# Patient Record
Sex: MALE | Race: WHITE | Employment: OTHER | URBAN - METROPOLITAN AREA
[De-identification: names, ages, dates, MRNs, and addresses within clinical notes are randomized per-mention and may not be internally consistent; named-entity substitution may affect disease eponyms.]

---

## 2017-03-30 PROBLEM — R60.0 BILATERAL LOWER EXTREMITY EDEMA: Status: ACTIVE | Noted: 2017-03-30

## 2017-03-30 PROBLEM — R41.0 ACUTE DELIRIUM: Status: ACTIVE | Noted: 2017-03-30

## 2017-03-30 PROBLEM — F41.8 DEPRESSION WITH ANXIETY: Status: ACTIVE | Noted: 2017-03-30

## 2017-03-30 PROBLEM — I87.2 VENOUS STASIS DERMATITIS OF BOTH LOWER EXTREMITIES: Status: ACTIVE | Noted: 2017-03-30

## 2017-12-15 ENCOUNTER — HOSPITAL ENCOUNTER (OUTPATIENT)
Dept: VASCULAR LAB | Age: 67
Discharge: HOME | End: 2017-12-15

## 2017-12-15 DIAGNOSIS — R60.0 LOCALIZED EDEMA: ICD-10-CM

## 2018-01-16 ENCOUNTER — OFFICE VISIT (OUTPATIENT)
Dept: SURGERY | Age: 68
End: 2018-01-16

## 2018-01-16 VITALS
WEIGHT: 201 LBS | SYSTOLIC BLOOD PRESSURE: 111 MMHG | HEART RATE: 74 BPM | HEIGHT: 70 IN | BODY MASS INDEX: 28.77 KG/M2 | DIASTOLIC BLOOD PRESSURE: 66 MMHG

## 2018-01-16 DIAGNOSIS — I87.2 VENOUS STASIS DERMATITIS OF BOTH LOWER EXTREMITIES: ICD-10-CM

## 2018-01-16 DIAGNOSIS — R60.0 BILATERAL LOWER EXTREMITY EDEMA: ICD-10-CM

## 2018-01-16 DIAGNOSIS — R60.0 LOCALIZED EDEMA: ICD-10-CM

## 2018-01-16 DIAGNOSIS — I89.0 LYMPHEDEMA OF LEFT LOWER EXTREMITY: Primary | ICD-10-CM

## 2018-01-16 DIAGNOSIS — L03.032 CELLULITIS OF TOE OF LEFT FOOT: ICD-10-CM

## 2018-01-16 PROCEDURE — 1123F ACP DISCUSS/DSCN MKR DOCD: CPT | Performed by: SURGERY

## 2018-01-16 PROCEDURE — 99204 OFFICE O/P NEW MOD 45 MIN: CPT | Performed by: SURGERY

## 2018-01-16 PROCEDURE — 4040F PNEUMOC VAC/ADMIN/RCVD: CPT | Performed by: SURGERY

## 2018-01-16 PROCEDURE — G8427 DOCREV CUR MEDS BY ELIG CLIN: HCPCS | Performed by: SURGERY

## 2018-01-16 PROCEDURE — G8419 CALC BMI OUT NRM PARAM NOF/U: HCPCS | Performed by: SURGERY

## 2018-01-16 PROCEDURE — 1036F TOBACCO NON-USER: CPT | Performed by: SURGERY

## 2018-01-16 PROCEDURE — 3017F COLORECTAL CA SCREEN DOC REV: CPT | Performed by: SURGERY

## 2018-01-16 PROCEDURE — G8484 FLU IMMUNIZE NO ADMIN: HCPCS | Performed by: SURGERY

## 2018-01-16 RX ORDER — DOXYCYCLINE 100 MG/1
CAPSULE ORAL
Status: ON HOLD | COMMUNITY
Start: 2018-01-10 | End: 2021-01-04 | Stop reason: ALTCHOICE

## 2018-01-16 ASSESSMENT — ENCOUNTER SYMPTOMS: COLOR CHANGE: 1

## 2018-01-16 NOTE — PROGRESS NOTES
Subjective:      Patient ID: Charleen Burgos is a 78 y.o. male. Chief Complaint   Patient presents with   [facility]     Patient here today referred by Martín Banerjee NP at [facility] for chronic edema of left lower extremity, on a pain scale 0/10 but does have discomfort. HPIPatient presents for left leg swelling. States that he has had swelling for 5 years. Ultrasound results were reviewed today and was negative for DVT. He has been given compression stockings in the past but he has not bought those yet. He is on Doxcycline for left leg cellulitis. He sees Dr Ifrah Wright podiatrist and he has prescribed the antibiotics (topical cream and oral)    Review of Systems   Cardiovascular: Positive for leg swelling (left leg lymphedema). Musculoskeletal: Positive for gait problem. Skin: Positive for color change (discolored left leg). All other systems reviewed and are negative. Objective:   Physical Exam   Constitutional: He is oriented to person, place, and time. He appears well-developed and well-nourished. HENT:   Head: Normocephalic and atraumatic. Right Ear: External ear normal.   Left Ear: External ear normal.   Eyes: Pupils are equal, round, and reactive to light. No scleral icterus. Neck: Normal range of motion. Neck supple. No JVD present. No tracheal deviation present. No thyromegaly present. Cardiovascular: Normal rate, regular rhythm, S1 normal and normal heart sounds. Exam reveals no gallop. No murmur heard. Pulses:       Carotid pulses are 2+ on the right side, and 2+ on the left side. Femoral pulses are 2+ on the right side, and 2+ on the left side. Dorsalis pedis pulses are 1+ on the right side, and 2+ on the left side. Posterior tibial pulses are 1+ on the right side, and 1+ on the left side. Pulmonary/Chest: Effort normal and breath sounds normal. He has no wheezes. He has no rales. He exhibits no tenderness.    Abdominal: Soft. Bowel sounds are normal. He exhibits no distension, no abdominal bruit and no mass. There is no hepatosplenomegaly. There is no tenderness. There is no rebound, no guarding and no CVA tenderness. No hernia. Hernia confirmed negative in the ventral area, confirmed negative in the right inguinal area and confirmed negative in the left inguinal area. Genitourinary:   Genitourinary Comments: RECTAL EXAM  &  Guaiac NOT INDICATED   Musculoskeletal: Normal range of motion. He exhibits no edema. Legs:       Left foot: There is tenderness and swelling. Feet:    LEG MEASUREMENTS TODAY 1/16/18    RIGHT ANKLE:  23.6 CM  RIGHT CALF:  39.8 CM    LEFT ANKLE:  30 CM  LEFT CALF:  41 CM   Lymphadenopathy:        Head (right side): No submental, no submandibular, no preauricular and no occipital adenopathy present. Head (left side): No submental, no submandibular, no preauricular and no occipital adenopathy present. He has no cervical adenopathy. Right cervical: No superficial cervical, no deep cervical and no posterior cervical adenopathy present. Left cervical: No superficial cervical, no deep cervical and no posterior cervical adenopathy present. Right axillary: No pectoral and no lateral adenopathy present. Left axillary: No pectoral and no lateral adenopathy present. Right: No inguinal and no supraclavicular adenopathy present. Left: No inguinal and no supraclavicular adenopathy present. Neurological: He is alert and oriented to person, place, and time. He displays no atrophy and no tremor. No cranial nerve deficit or sensory deficit. He exhibits normal muscle tone. Coordination and gait abnormal.   Skin: Skin is warm and dry. Psychiatric: He has a normal mood and affect. His behavior is normal. Judgment and thought content normal.   Disheveled with body odor       Assessment:      1. Lymphedema of left lower extremity     2.  Cellulitis of toe of left

## 2018-01-16 NOTE — PATIENT INSTRUCTIONS
Continue with the Doxycycline until gone. Continue with antibiotic ointment to the 3rd toe on of left foot. Continue to see Dr Axel Villanueva for foot care. New RX given today for surgical compression stockings 30-40mmHG. If he does get the stockings, return to office in 6 weeks.

## 2018-03-06 ENCOUNTER — OFFICE VISIT (OUTPATIENT)
Dept: SURGERY | Age: 68
End: 2018-03-06

## 2018-03-06 VITALS
DIASTOLIC BLOOD PRESSURE: 79 MMHG | BODY MASS INDEX: 30.24 KG/M2 | HEIGHT: 71 IN | WEIGHT: 216 LBS | SYSTOLIC BLOOD PRESSURE: 135 MMHG | HEART RATE: 78 BPM

## 2018-03-06 DIAGNOSIS — L03.032 CELLULITIS OF TOE OF LEFT FOOT: ICD-10-CM

## 2018-03-06 DIAGNOSIS — I87.2 VENOUS STASIS DERMATITIS OF BOTH LOWER EXTREMITIES: ICD-10-CM

## 2018-03-06 DIAGNOSIS — R60.0 BILATERAL LOWER EXTREMITY EDEMA: Primary | ICD-10-CM

## 2018-03-06 DIAGNOSIS — E78.2 MIXED HYPERLIPIDEMIA: ICD-10-CM

## 2018-03-06 PROCEDURE — 99213 OFFICE O/P EST LOW 20 MIN: CPT | Performed by: SURGERY

## 2018-03-06 PROCEDURE — 3017F COLORECTAL CA SCREEN DOC REV: CPT | Performed by: SURGERY

## 2018-03-06 PROCEDURE — G8484 FLU IMMUNIZE NO ADMIN: HCPCS | Performed by: SURGERY

## 2018-03-06 PROCEDURE — 4040F PNEUMOC VAC/ADMIN/RCVD: CPT | Performed by: SURGERY

## 2018-03-06 PROCEDURE — 1036F TOBACCO NON-USER: CPT | Performed by: SURGERY

## 2018-03-06 PROCEDURE — G8427 DOCREV CUR MEDS BY ELIG CLIN: HCPCS | Performed by: SURGERY

## 2018-03-06 PROCEDURE — G8417 CALC BMI ABV UP PARAM F/U: HCPCS | Performed by: SURGERY

## 2018-03-06 PROCEDURE — 1123F ACP DISCUSS/DSCN MKR DOCD: CPT | Performed by: SURGERY

## 2018-03-06 ASSESSMENT — ENCOUNTER SYMPTOMS
EYE DISCHARGE: 0
COLOR CHANGE: 1
EYE REDNESS: 0
EYE PAIN: 0
PHOTOPHOBIA: 0
EYE ITCHING: 0
GASTROINTESTINAL NEGATIVE: 1
ALLERGIC/IMMUNOLOGIC NEGATIVE: 1
RESPIRATORY NEGATIVE: 1

## 2018-03-06 NOTE — PROGRESS NOTES
External ear normal.   Left Ear: External ear normal.   Eyes: Pupils are equal, round, and reactive to light. No scleral icterus. Neck: Normal range of motion. Neck supple. No JVD present. No tracheal deviation present. No thyromegaly present. Cardiovascular: Normal rate, regular rhythm, S1 normal and normal heart sounds. Exam reveals no gallop. No murmur heard. Pulses:       Carotid pulses are 2+ on the right side, and 2+ on the left side. Femoral pulses are 2+ on the right side, and 2+ on the left side. Dorsalis pedis pulses are 2+ on the right side, and 2+ on the left side. Posterior tibial pulses are 1+ on the right side, and 1+ on the left side. MEASUREMENTS 23/6/2018:    RIGHT ANKLE: 24.8 cm    RIGHT CALF: 39.7 cm     LEFT ANKLE: 28.4 cm   LEFT CALF: 52.5 cm     LEG MEASUREMENTS  1/16/18    RIGHT ANKLE:  23.6 CM  RIGHT CALF:  39.8 CM    LEFT ANKLE:  30 CM  LEFT CALF:  41 CM           Pulmonary/Chest: Effort normal and breath sounds normal. He has no wheezes. He has no rales. He exhibits no tenderness. Abdominal: Soft. Bowel sounds are normal. He exhibits no distension, no abdominal bruit and no mass. There is no hepatosplenomegaly. There is no tenderness. There is no rebound, no guarding and no CVA tenderness. No hernia. Hernia confirmed negative in the ventral area, confirmed negative in the right inguinal area and confirmed negative in the left inguinal area. Genitourinary:   Genitourinary Comments: RECTAL EXAM  &  Guaiac NOT INDICATED   Musculoskeletal: Normal range of motion. He exhibits no edema. Legs:       Left foot: There is tenderness and swelling. Feet:    Lymphadenopathy:        Head (right side): No submental, no submandibular, no preauricular and no occipital adenopathy present. Head (left side): No submental, no submandibular, no preauricular and no occipital adenopathy present. He has no cervical adenopathy.         Right cervical: No superficial cervical, no deep cervical and no posterior cervical adenopathy present. Left cervical: No superficial cervical, no deep cervical and no posterior cervical adenopathy present. Right axillary: No pectoral and no lateral adenopathy present. Left axillary: No pectoral and no lateral adenopathy present. Right: No inguinal and no supraclavicular adenopathy present. Left: No inguinal and no supraclavicular adenopathy present. Neurological: He is alert and oriented to person, place, and time. He displays no atrophy and no tremor. No cranial nerve deficit or sensory deficit. He exhibits normal muscle tone. Coordination and gait abnormal.   Skin: Skin is warm and dry. Psychiatric: He has a normal mood and affect. His behavior is normal. Judgment and thought content normal.   Disheveled with body odor         ASSESSMENT:    Problem List Items Addressed This Visit     Mixed hyperlipidemia    Bilateral lower extremity edema - Primary    Venous stasis dermatitis of both lower extremities    Cellulitis of toe of left foot      POOR hygiene  Stockings shredded    PLAN:  Mr. Maurice Lazar has been advised to get a new pair of surgical stockings, he should apply the stockings daily to the left lower extremity. He has also been informed to purchase the appropriate gloves to help apply the surgical stockings. Applied two Ace-Bandages to the left lower extremity, he has been advised that he may take the Ace-Bandages off at night and re-apply these during the day. Polysporin applied to the toe of the left foot with a bandage. MEASUREMENTS 23/6/2018:    RIGHT ANKLE: 24.8 cm    RIGHT CALF: 39.7 cm     LEFT ANKLE: 28.4 cm   LEFT CALF: 52.5 cm     PREVIOUS LEG MEASUREMENTS 1/16/18    RIGHT ANKLE:  23.6 CM  RIGHT CALF:  39.8 CM    LEFT ANKLE:  30 CM  LEFT CALF:  41 CM    Return in about 3 months (around 6/6/2018), or surgical stocking check and swelling check.     CHERYL Melendez MA am scribing for and in the presence of Michelle Joe MD on this date of 03/07/18    I Petra Vazquez MD personally performed the services described in this documentation as scribed by the Medical Assistant Sulma Dawson  in my presence and it is both accurate and complete.         Electronically signed by Michelle Joe MD on 3/7/2018 at 12:52 PM

## 2021-01-03 ENCOUNTER — HOSPITAL ENCOUNTER (INPATIENT)
Age: 71
LOS: 4 days | Discharge: HOSPICE | DRG: 871 | End: 2021-01-07
Attending: EMERGENCY MEDICINE | Admitting: INTERNAL MEDICINE
Payer: MEDICARE

## 2021-01-03 ENCOUNTER — APPOINTMENT (OUTPATIENT)
Dept: CT IMAGING | Age: 71
DRG: 871 | End: 2021-01-03
Payer: MEDICARE

## 2021-01-03 ENCOUNTER — APPOINTMENT (OUTPATIENT)
Dept: GENERAL RADIOLOGY | Age: 71
DRG: 871 | End: 2021-01-03
Payer: MEDICARE

## 2021-01-03 DIAGNOSIS — T79.6XXA TRAUMATIC RHABDOMYOLYSIS, INITIAL ENCOUNTER (HCC): Primary | ICD-10-CM

## 2021-01-03 DIAGNOSIS — E86.0 DEHYDRATION: ICD-10-CM

## 2021-01-03 DIAGNOSIS — R41.0 DELIRIUM: ICD-10-CM

## 2021-01-03 DIAGNOSIS — R31.9 URINARY TRACT INFECTION WITH HEMATURIA, SITE UNSPECIFIED: ICD-10-CM

## 2021-01-03 DIAGNOSIS — N39.0 URINARY TRACT INFECTION WITH HEMATURIA, SITE UNSPECIFIED: ICD-10-CM

## 2021-01-03 PROBLEM — M62.82 RHABDOMYOLYSIS: Status: ACTIVE | Noted: 2021-01-03

## 2021-01-03 LAB
A/G RATIO: 1.2 (ref 1.1–2.2)
ALBUMIN SERPL-MCNC: 3.7 G/DL (ref 3.4–5)
ALP BLD-CCNC: 75 U/L (ref 40–129)
ALT SERPL-CCNC: 42 U/L (ref 10–40)
AMPHETAMINE SCREEN, URINE: NORMAL
ANION GAP SERPL CALCULATED.3IONS-SCNC: 10 MMOL/L (ref 3–16)
AST SERPL-CCNC: 101 U/L (ref 15–37)
BARBITURATE SCREEN URINE: NORMAL
BASE EXCESS VENOUS: 3.4 MMOL/L
BASOPHILS ABSOLUTE: 0 K/UL (ref 0–0.2)
BASOPHILS RELATIVE PERCENT: 0.1 %
BENZODIAZEPINE SCREEN, URINE: NORMAL
BILIRUB SERPL-MCNC: 0.8 MG/DL (ref 0–1)
BILIRUBIN URINE: ABNORMAL
BLOOD, URINE: ABNORMAL
BUN BLDV-MCNC: 32 MG/DL (ref 7–20)
CALCIUM SERPL-MCNC: 9.3 MG/DL (ref 8.3–10.6)
CANNABINOID SCREEN URINE: NORMAL
CARBOXYHEMOGLOBIN: 1.1 %
CHLORIDE BLD-SCNC: 104 MMOL/L (ref 99–110)
CLARITY: ABNORMAL
CO2: 26 MMOL/L (ref 21–32)
COCAINE METABOLITE SCREEN URINE: NORMAL
COLOR: YELLOW
COMMENT UA: ABNORMAL
CREAT SERPL-MCNC: 0.6 MG/DL (ref 0.8–1.3)
EOSINOPHILS ABSOLUTE: 0 K/UL (ref 0–0.6)
EOSINOPHILS RELATIVE PERCENT: 0 %
EPITHELIAL CELLS, UA: 4 /HPF (ref 0–5)
GFR AFRICAN AMERICAN: >60
GFR NON-AFRICAN AMERICAN: >60
GLOBULIN: 3.1 G/DL
GLUCOSE BLD-MCNC: 122 MG/DL (ref 70–99)
GLUCOSE URINE: NEGATIVE MG/DL
HCO3 VENOUS: 29 MMOL/L (ref 23–29)
HCT VFR BLD CALC: 41 % (ref 40.5–52.5)
HEMOGLOBIN: 13.8 G/DL (ref 13.5–17.5)
HYALINE CASTS: 10 /LPF (ref 0–8)
KETONES, URINE: 15 MG/DL
LACTIC ACID, SEPSIS: 1.9 MMOL/L (ref 0.4–1.9)
LEUKOCYTE ESTERASE, URINE: NEGATIVE
LYMPHOCYTES ABSOLUTE: 0.5 K/UL (ref 1–5.1)
LYMPHOCYTES RELATIVE PERCENT: 2.7 %
Lab: NORMAL
MCH RBC QN AUTO: 30.6 PG (ref 26–34)
MCHC RBC AUTO-ENTMCNC: 33.6 G/DL (ref 31–36)
MCV RBC AUTO: 91.2 FL (ref 80–100)
METHADONE SCREEN, URINE: NORMAL
METHEMOGLOBIN VENOUS: 0.5 %
MICROSCOPIC EXAMINATION: YES
MONOCYTES ABSOLUTE: 1 K/UL (ref 0–1.3)
MONOCYTES RELATIVE PERCENT: 6 %
NEUTROPHILS ABSOLUTE: 15.7 K/UL (ref 1.7–7.7)
NEUTROPHILS RELATIVE PERCENT: 91.2 %
NITRITE, URINE: NEGATIVE
O2 CONTENT, VEN: 7 ML/DL
O2 SAT, VEN: 35 %
O2 THERAPY: NORMAL
OPIATE SCREEN URINE: NORMAL
OXYCODONE URINE: NORMAL
PCO2, VEN: 49.1 MMHG (ref 40–50)
PDW BLD-RTO: 13.1 % (ref 12.4–15.4)
PH UA: 5.5 (ref 5–8)
PH UA: 6
PH VENOUS: 7.39 (ref 7.35–7.45)
PHENCYCLIDINE SCREEN URINE: NORMAL
PLATELET # BLD: 291 K/UL (ref 135–450)
PMV BLD AUTO: 6.9 FL (ref 5–10.5)
PO2, VEN: 22 MMHG
POTASSIUM SERPL-SCNC: 4.2 MMOL/L (ref 3.5–5.1)
PROPOXYPHENE SCREEN: NORMAL
PROTEIN UA: 30 MG/DL
RBC # BLD: 4.49 M/UL (ref 4.2–5.9)
RBC UA: 22 /HPF (ref 0–4)
RENAL EPITHELIAL, UA: ABNORMAL /HPF (ref 0–1)
SARS-COV-2, NAAT: NOT DETECTED
SODIUM BLD-SCNC: 140 MMOL/L (ref 136–145)
SPECIFIC GRAVITY UA: >=1.03 (ref 1–1.03)
TCO2 CALC VENOUS: 31 MMOL/L
TOTAL CK: 3024 U/L (ref 39–308)
TOTAL PROTEIN: 6.8 G/DL (ref 6.4–8.2)
TROPONIN: <0.01 NG/ML
URINE REFLEX TO CULTURE: YES
URINE TYPE: ABNORMAL
UROBILINOGEN, URINE: 0.2 E.U./DL
WBC # BLD: 17.2 K/UL (ref 4–11)
WBC UA: 55 /HPF (ref 0–5)

## 2021-01-03 PROCEDURE — 81001 URINALYSIS AUTO W/SCOPE: CPT

## 2021-01-03 PROCEDURE — 99284 EMERGENCY DEPT VISIT MOD MDM: CPT

## 2021-01-03 PROCEDURE — 70450 CT HEAD/BRAIN W/O DYE: CPT

## 2021-01-03 PROCEDURE — 6370000000 HC RX 637 (ALT 250 FOR IP): Performed by: INTERNAL MEDICINE

## 2021-01-03 PROCEDURE — 96374 THER/PROPH/DIAG INJ IV PUSH: CPT

## 2021-01-03 PROCEDURE — 82550 ASSAY OF CK (CPK): CPT

## 2021-01-03 PROCEDURE — 83605 ASSAY OF LACTIC ACID: CPT

## 2021-01-03 PROCEDURE — 72125 CT NECK SPINE W/O DYE: CPT

## 2021-01-03 PROCEDURE — 71045 X-RAY EXAM CHEST 1 VIEW: CPT

## 2021-01-03 PROCEDURE — 36415 COLL VENOUS BLD VENIPUNCTURE: CPT

## 2021-01-03 PROCEDURE — 6360000002 HC RX W HCPCS: Performed by: EMERGENCY MEDICINE

## 2021-01-03 PROCEDURE — 80307 DRUG TEST PRSMV CHEM ANLYZR: CPT

## 2021-01-03 PROCEDURE — 73630 X-RAY EXAM OF FOOT: CPT

## 2021-01-03 PROCEDURE — 6360000002 HC RX W HCPCS: Performed by: INTERNAL MEDICINE

## 2021-01-03 PROCEDURE — 2580000003 HC RX 258: Performed by: INTERNAL MEDICINE

## 2021-01-03 PROCEDURE — 93005 ELECTROCARDIOGRAM TRACING: CPT | Performed by: EMERGENCY MEDICINE

## 2021-01-03 PROCEDURE — 82803 BLOOD GASES ANY COMBINATION: CPT

## 2021-01-03 PROCEDURE — 72170 X-RAY EXAM OF PELVIS: CPT

## 2021-01-03 PROCEDURE — 80053 COMPREHEN METABOLIC PANEL: CPT

## 2021-01-03 PROCEDURE — U0002 COVID-19 LAB TEST NON-CDC: HCPCS

## 2021-01-03 PROCEDURE — 2580000003 HC RX 258: Performed by: EMERGENCY MEDICINE

## 2021-01-03 PROCEDURE — 72131 CT LUMBAR SPINE W/O DYE: CPT

## 2021-01-03 PROCEDURE — 85025 COMPLETE CBC W/AUTO DIFF WBC: CPT

## 2021-01-03 PROCEDURE — 2060000000 HC ICU INTERMEDIATE R&B

## 2021-01-03 PROCEDURE — 84484 ASSAY OF TROPONIN QUANT: CPT

## 2021-01-03 PROCEDURE — 87086 URINE CULTURE/COLONY COUNT: CPT

## 2021-01-03 RX ORDER — ASPIRIN 300 MG/1
300 SUPPOSITORY RECTAL DAILY
Status: DISCONTINUED | OUTPATIENT
Start: 2021-01-03 | End: 2021-01-07

## 2021-01-03 RX ORDER — SODIUM CHLORIDE 9 MG/ML
INJECTION, SOLUTION INTRAVENOUS CONTINUOUS
Status: DISCONTINUED | OUTPATIENT
Start: 2021-01-03 | End: 2021-01-07

## 2021-01-03 RX ORDER — QUETIAPINE FUMARATE 25 MG/1
50 TABLET, FILM COATED ORAL 2 TIMES DAILY
Status: DISCONTINUED | OUTPATIENT
Start: 2021-01-03 | End: 2021-01-07 | Stop reason: HOSPADM

## 2021-01-03 RX ORDER — 0.9 % SODIUM CHLORIDE 0.9 %
500 INTRAVENOUS SOLUTION INTRAVENOUS ONCE
Status: COMPLETED | OUTPATIENT
Start: 2021-01-03 | End: 2021-01-03

## 2021-01-03 RX ORDER — ASPIRIN 81 MG/1
81 TABLET ORAL DAILY
Status: DISCONTINUED | OUTPATIENT
Start: 2021-01-03 | End: 2021-01-07

## 2021-01-03 RX ORDER — ONDANSETRON 2 MG/ML
4 INJECTION INTRAMUSCULAR; INTRAVENOUS EVERY 6 HOURS PRN
Status: DISCONTINUED | OUTPATIENT
Start: 2021-01-03 | End: 2021-01-07 | Stop reason: HOSPADM

## 2021-01-03 RX ORDER — PRAVASTATIN SODIUM 40 MG
40 TABLET ORAL DAILY
Status: DISCONTINUED | OUTPATIENT
Start: 2021-01-04 | End: 2021-01-07

## 2021-01-03 RX ORDER — POLYETHYLENE GLYCOL 3350 17 G/17G
17 POWDER, FOR SOLUTION ORAL DAILY PRN
Status: DISCONTINUED | OUTPATIENT
Start: 2021-01-03 | End: 2021-01-07 | Stop reason: HOSPADM

## 2021-01-03 RX ORDER — SODIUM CHLORIDE 9 MG/ML
INJECTION, SOLUTION INTRAVENOUS CONTINUOUS
Status: DISCONTINUED | OUTPATIENT
Start: 2021-01-03 | End: 2021-01-03 | Stop reason: SDUPTHER

## 2021-01-03 RX ORDER — SODIUM CHLORIDE 0.9 % (FLUSH) 0.9 %
10 SYRINGE (ML) INJECTION EVERY 12 HOURS SCHEDULED
Status: DISCONTINUED | OUTPATIENT
Start: 2021-01-03 | End: 2021-01-07 | Stop reason: HOSPADM

## 2021-01-03 RX ORDER — PROMETHAZINE HYDROCHLORIDE 25 MG/1
12.5 TABLET ORAL EVERY 6 HOURS PRN
Status: DISCONTINUED | OUTPATIENT
Start: 2021-01-03 | End: 2021-01-07 | Stop reason: HOSPADM

## 2021-01-03 RX ORDER — SODIUM CHLORIDE 0.9 % (FLUSH) 0.9 %
10 SYRINGE (ML) INJECTION PRN
Status: DISCONTINUED | OUTPATIENT
Start: 2021-01-03 | End: 2021-01-07 | Stop reason: HOSPADM

## 2021-01-03 RX ORDER — ASPIRIN 81 MG/1
81 TABLET, CHEWABLE ORAL DAILY
Status: DISCONTINUED | OUTPATIENT
Start: 2021-01-03 | End: 2021-01-03 | Stop reason: SDUPTHER

## 2021-01-03 RX ADMIN — ASPIRIN 300 MG: 300 SUPPOSITORY RECTAL at 23:20

## 2021-01-03 RX ADMIN — SODIUM CHLORIDE: 9 INJECTION, SOLUTION INTRAVENOUS at 22:26

## 2021-01-03 RX ADMIN — CEFTRIAXONE 1 G: 1 INJECTION, POWDER, FOR SOLUTION INTRAMUSCULAR; INTRAVENOUS at 14:21

## 2021-01-03 RX ADMIN — SODIUM CHLORIDE 500 ML: 9 INJECTION, SOLUTION INTRAVENOUS at 13:11

## 2021-01-03 RX ADMIN — CEFEPIME HYDROCHLORIDE 2 G: 2 INJECTION, POWDER, FOR SOLUTION INTRAVENOUS at 22:25

## 2021-01-03 RX ADMIN — VANCOMYCIN HYDROCHLORIDE 1000 MG: 1 INJECTION, POWDER, LYOPHILIZED, FOR SOLUTION INTRAVENOUS at 23:20

## 2021-01-03 RX ADMIN — Medication 10 ML: at 22:25

## 2021-01-03 RX ADMIN — SODIUM CHLORIDE: 9 INJECTION, SOLUTION INTRAVENOUS at 14:22

## 2021-01-03 ASSESSMENT — PAIN SCALES - GENERAL: PAINLEVEL_OUTOF10: 0

## 2021-01-03 NOTE — ED PROVIDER NOTES
[facility]  eMERGENCY dEPARTMENT eNCOUnter      Pt Name: Travis Tuttle  MRN: [id number]  [place] 1950  Date of evaluation: 1/3/2021  Provider: Kenia Magana MD    CHIEF COMPLAINT       Chief Complaint   Patient presents with    Altered Mental Status     Was found down by fanacial advisor, unsure how long he was on the floor          CRITICAL CARE TIME   Total Critical Care time was 0 minutes, excluding separately reportable procedures. There was a high probability of clinically significant/life threatening deterioration in the patient's condition which required my urgent intervention. HISTORY OF PRESENT ILLNESS  (Location/Symptom, Timing/Onset, Context/Setting, Quality, Duration, Modifying Factors, Severity.)   Travis Tuttle is a 72 y.o. male who presents to the emergency department via [address] [place] [address] from home. He was apparently found down in his home by his  who would come over to visit him. He apparently lives alone. He was found covered in stool. When he arrived here he was awake and alert. He was oriented to person, place, he knew it was January 3, 2021. He kept talking about having a needle in front of his eye, and that someone had removed it for him. He said that Monika Harrison was at his house this morning. He told the nurse that Young was still alive. When asked about the stool all over his body, he states that a cat has been coming in his house and pooping on him. He also stated that he had to go to court tomorrow with Regina Ryann. He denies any head injury or head pain. He denies chest pain. He denies abdominal pain. He does admit that he feels weak. He states his legs are weak. And asked if he has some low back pain, he is somewhat ambivalent, but says it is a little sore. He denies abdominal pain. He thinks he fell yesterday, but he thinks today is Tuesday. Nursing Notes were reviewed and I agree.     REVIEW OF SYSTEMS    (2-9 systems for level 4, 10 or more for level 5)     HEENT: Denies head injury. Denies head pain. Denies facial pain. Cardiovascular: Denies chest or rib pain. Pulmonary: Denies shortness of breath or cough. GI: Denies abdominal pain nausea or vomiting. He is not sure when he last ate. : Denies dysuria or frequency. Musculoskeletal: Questionably some low back pain. Neuro: Weakness, generalized, bilateral leg weakness. Except as noted above the remainder of the review of systems was reviewed and negative. PAST MEDICAL HISTORY     Past Medical History:   Diagnosis Date    Anxiety     Auditory hallucinations     Cancer ([facility] [address]     Hyperlipidemia     Hypokalemia     Merkel cell cancer ([facility] 2007    Remission in 2007    Psychoactive substance-induced organic mood disorder ([facility]     Water retention          SURGICAL HISTORY     History reviewed. No pertinent surgical history. CURRENT MEDICATIONS       Previous Medications    DOXYCYCLINE MONOHYDRATE (MONODOX) 100 MG CAPSULE        FUROSEMIDE (LASIX) 40 MG TABLET    Take 20 mg by mouth daily     PRAVASTATIN (PRAVACHOL) 40 MG TABLET    Take 40 mg by mouth daily. ALLERGIES     Patient has no known allergies. FAMILY HISTORY     History reviewed. No pertinent family history.        SOCIAL HISTORY       Social History     Socioeconomic History    Marital status: Single     Spouse name: None    Number of children: None    Years of education: None    Highest education level: None   Occupational History    None   Social Needs    Financial resource strain: None    Food insecurity     Worry: None     Inability: None    Transportation needs     Medical: None     Non-medical: None   Tobacco Use    Smoking status: Former Smoker    Smokeless tobacco: Never Used   Substance and Sexual Activity    Alcohol use: No    Drug use: No    Sexual activity: None   Lifestyle    Physical activity     Days per week: None     Minutes per session: None    Stress: None   Relationships    Social connections     Talks on phone: None     Gets together: None     Attends Church service: None     Active member of club or organization: None     Attends meetings of clubs or organizations: None     Relationship status: None    Intimate partner violence     Fear of current or ex partner: None     Emotionally abused: None     Physically abused: None     Forced sexual activity: None   Other Topics Concern    None   Social History Narrative    None         PHYSICAL EXAM    (up to 7 for level 4, 8 or more for level 5)     ED Triage Vitals [01/03/21 1144]   BP Temp Temp Source Pulse Resp SpO2 Height Weight   119/75 97.7 °F (36.5 °C) Oral 70 17 100 % 5' 9\" (1.753 m) 167 lb 5.3 oz (75.9 kg)       General: Alert elderly male in no acute distress. Head: Atraumatic and normocephalic. Eyes: No conjunctival injection. Pupils equal round reactive. Extraocular movements are intact. ENT: TMs are normal.  Nose clear. Oropharynx shows some decreased moisture. No erythema. Neck: Supple, no adenopathy, nontender. Heart: Regular rate and rhythm. No murmurs or gallops noted. Lungs: Breath sounds equal bilaterally and clear. Abdomen: Soft, nondistended, nontender. No masses organomegaly. Musculoskeletal: Full range of motion of his upper extremities without bony tenderness. His pelvis is stable on rocking, he denies pain. Intact passive range of motion of his lower extremities without pain in his hips knees or ankles. He has 2+ minimally pitting edema bilateral lower extremities. He has stasis dermatitis on the left lower leg. His lower legs below his knees including his ankle and feet are somewhat cool to touch. He has some purplish discoloration swelling of his right second toe. Skin: Pale, dry, no diaphoresis. Both his lower legs are covered in dried stool. Purplish discoloration of the right second toe as noted above.   Neuro: Awake, alert, oriented to person place and time. Symmetrical reactive pupils. Intact extraocular movements. No facial asymmetry. Symmetrical motor function in his upper extremities, no drift. Bilateral lower extremity weakness, he is unable to lift them off the bed. He is able to flex and extend his ankles and wiggle his toes. DIFFERENTIAL DIAGNOSIS   Differential includes but is not limited to intracerebral hemorrhage, subdural, subarachnoid hemorrhage, CVA, rhabdomyolysis, dehydration, electrolyte abnormality, hypoglycemia, hyperglycemia, UTI, sepsis      DIAGNOSTIC RESULTS     EKG: All EKG's are interpreted by Kathleen Guzmán MD in the absence of a cardiologist.    Sinus rhythm, rate of 64, short FL interval, no acute ST-T wave changes. Rhythm strip shows sinus rhythm with a rate of 64, FL interval of 102 ms, QRS 76 ms with no other ectopy as interpreted by me. This compared to an EKG dated 5/21/2017, the short FL interval is new, no other significant changes noted. RADIOLOGY:   Non-plain film images such as CT, Ultrasound and MRI are read by the radiologist. Plain radiographic images are visualized and preliminarily interpreted Kathleen Guzmán MD with the below findings:        Interpretation per the Radiologist below, if available at the time of this note:    XR FOOT RIGHT (MIN 3 VIEWS)   Final Result   Degenerative changes and mild diffuse soft tissue edema. No acute fracture. CT HEAD WO CONTRAST   Final Result   No acute intracranial abnormality. No acute fracture of the cervical or lumbar spine. CT CERVICAL SPINE WO CONTRAST   Final Result   No acute intracranial abnormality. No acute fracture of the cervical or lumbar spine. CT LUMBAR SPINE WO CONTRAST   Final Result   No acute intracranial abnormality. No acute fracture of the cervical or lumbar spine.          XR CHEST PORTABLE   Final Result   No acute findings         XR PELVIS (1-2 VIEWS) Narrative:     Performed at:  [facility]  [address] [place], [place] [address]   Phone [phone]   CULTURE, URINE   LACTATE, SEPSIS    Narrative:     Performed at:  [facility]  [address] [place], [place] [address]   Phone [phone]   BLOOD GAS, VENOUS    Narrative:     Performed at:  [facility]  [address] [place], [place] [address]   Phone [phone]    Narrative:     ORDER WAS CANCELLED 01/03/2021 13:18, Cancelled: Sent to Reference Laboratory. Performed at:  [facility]  [address] [place], [place] [address]   Phone [phone]       All other labs were within normal range or not returned as of this dictation. EMERGENCY DEPARTMENT COURSE and DIFFERENTIAL DIAGNOSIS/MDM:   Vitals:    Vitals:    01/03/21 1144   BP: 119/75   Pulse: 70   Resp: 17   Temp: 97.7 °F (36.5 °C)   TempSrc: Oral   SpO2: 100%   Weight: 167 lb 5.3 oz (75.9 kg)   Height: 5' 9\" (1.753 m)       This patient was found down at home in the floor by his  and come over to see the patient. Patient lives alone. Not clear how long he was down. The patient states he fell yesterday, he knows the date is 1/3/2021, but he thinks that it is Tuesday. His only specific complaint is that his legs are weak. He denies head injury or headache. He denies chest pain or shortness of breath. He denies abdominal pain. His vital signs are stable. He is afebrile. His O2 saturations 100%. His urinalysis does show 55 white cells, 22 red cells, and 10 hyaline casts. His lactic acid is normal.  His H&H is normal.  His white blood count of 17,200 with a left shift. Electrolytes are normal.  His BUN is elevated at 32 with a creatinine of 0.6, likely related to some degree of dehydration.   Total CK is 3024, consistent with rhabdomyolysis from lying on the floor for an extended period of time. Saline bolus was ordered and maintenance IV fluids were ordered. A Castro catheter was placed to follow urine outputs. He does have some erythema and swelling of his right second toe. The x-ray shows some degenerative changes and soft tissue swelling. This could be a cellulitis. I cannot rule out an ischemic toe. CT of his head shows no acute intracranial abnormality. CT of his cervical spine shows no fracture or acute abnormality. CT of his lumbar spine shows no fracture or acute abnormality. Chest x-ray shows no acute cardiopulmonary abnormalities. X-ray of his pelvis shows an ossification inferior medial to the left hip near the lesser trochanter. Possibly from previous injury or soft tissue calcification. They recommend dedicated radiographs of the left hip. This patient will need to be admitted for altered mental status/delirium. Will require hydration for his dehydration and rhabdomyolysis. He will require IV antibiotics for his urinary tract infection. Further work-up as indicated per the hospitalist.      [phone]: The patient's nurse did make contact with the patient's POA. She discussed with the POA the patient's presentation. The POA said that he had autism. She states that his delusions are normal for him, his baseline. They feel that the patient is at a point where he needs to be placed, he can no longer function at home. CONSULTS:  IP CONSULT TO HOSPITALIST    PROCEDURES:  None    FINAL IMPRESSION      1. Traumatic rhabdomyolysis, initial encounter ([facility] [address]    2. Dehydration    3. Urinary tract infection with hematuria, site unspecified    4. Delirium          DISPOSITION/PLAN   DISPOSITION Decision To Admit 01/03/2021 02:09:33 PM      PATIENT REFERRED TO:  No follow-up provider specified.     DISCHARGE MEDICATIONS:  New Prescriptions    No medications on file       (Please note that portions of this note were completed with a voice recognition program. Efforts were made to edit the dictations but occasionally words are mis-transcribed.)    Fay Mart MD  Attending Emergency Physician        Lexi Abreu MD  01/03/21 [id number]

## 2021-01-03 NOTE — ED NOTES
Patients financial POA called and updated this RN on the patients information and how the patient was found today. Contacts updated in the patients chart. Angely Ulloa is the patients financial POA and Giles Daniel is healthcare POA both appointed by Jacquelyn tinoco. Their supervisor is Brenda Barber, and they are all through a company called [employer]. The patient has a history of autism and Ed saw him Thursday and he was at his baseline. He states today when he went in the patient was on the floor covered in old urine and stool. The patient often talks about old musicians and movies, patient is very in to music. Patient gets home health from [facility] [address] has cancelled that as well as [facility] [employer] [facility] for tomorrow.   Ed states they will likely try to find the patient placement after this episode rather than sending him home      Essie Don RN  01/03/21 [id number]

## 2021-01-03 NOTE — ED NOTES
Bed: B-04  Expected date: 1/3/21  Expected time: 11:23 AM  Means of arrival: [facility]  Comments:  71 y/o Male      Harmony Carrero [facility]  01/03/21 1136

## 2021-01-03 NOTE — H&P
Hospital Medicine History & Physical      PCP: Jacquie Gates MD    Date of Admission: 1/3/2021    Date of Service: Pt seen/examined on 1/3/2021 and Admitted to Inpatient    Chief Complaint:  Found down on the floor - unclear how long. History Of Present Illness: The patient is a 72 y.o. male w Hx of substance induced psychiatric disorder, Merkel Cell Ca in remission as of 2007, anxiety, who presents to [facility] after he was found down. Pt is awake, alert and actively hallucinating with paranoid and ideas of grandiose. He is unable to provide any cohesive Hx albeit he knows he is in the Hospital.     Per EMS reports his caregiver found him supine on the floor in his apartment with front door open, covered in urine and feces, and called EMS. It is unclear how long he was down. Work up in ED concerning for acute psychotic episode, early sepsis - likely from R 3rd toe infection, and mild rhabdomyolysis. Past Medical History:        Diagnosis Date    Anxiety     Auditory hallucinations     [facility] [address]     Hyperlipidemia     Hypokalemia     Merkel cell cancer ([facility] [address] 2007    Remission in 2007    Psychoactive substance-induced organic mood disorder ([facility] [address]     Water retention        Past Surgical History:    History reviewed. No pertinent surgical history. Medications Prior to Admission:    Prior to Admission medications    Medication Sig Start Date End Date Taking? Authorizing Provider   pravastatin (PRAVACHOL) 40 MG tablet Take 40 mg by mouth daily. Yes Historical Provider, MD   doxycycline monohydrate (MONODOX) 100 MG capsule  1/10/18   Historical Provider, MD   furosemide (LASIX) 40 MG tablet Take 20 mg by mouth daily     Historical Provider, MD       Allergies:  Patient has no known allergies.     Social History:  The patient ALKPH[address]     COAG  No results for input(s): INR in the last 72 hours. CARDIAC ENZYMES  Recent Labs     01/03/21  1212   CKTOTAL 3,024*       U/A:    Lab Results   Component Value Date    COLORU Yellow 01/03/2021    WBCUA 55 01/03/2021    RBCUA 22 01/03/2021    CLARITYU SL CLOUDY 01/03/2021    SPECGRAV >=1.030 01/03/2021    LEUKOCYTESUR Negative 01/03/2021    BLOODU LARGE 01/03/2021    GLUCOSEU Negative 01/03/2021       ABG  No results found for: ABY7QQY, BEART, E7HQKKIV, PHART, THGBART, FGW0JNP, PO2ART, CRP0JWS        Active Hospital Problems    Diagnosis Date Noted    Rhabdomyolysis [M62.82] 01/03/2021         PHYSICIANS CERTIFICATION:    I certify that Anitra Wade is expected to be hospitalized for more than 2 midnights based on the following assessment and plan:      ASSESSMENT/PLAN:    Early Sepsis secondary to R 3rd toe cellulitis. Severe onychomycosis due to lack of self hygiene. Plan:    - IV vanc and cefepime. - podiatry consult. - consider vascular studies pending response to above. - I did order venous dopplers due to b/l LE edema. Rhabdomyolysis - due to prolonged immobility. IVF and trend CK. Fall and down at home. Etiology not clear. CT head non focal.   Initiated CVA order set. Ordered MRI brain. Monitor in seizure precautions. Consider EEG and neurology consult Monday. Hx of Psych Disorder substance induced. Pt denies taking anything but his prescribed meds. Urine drug screen ordered. Seroquel for symptom control. I suspect he is going through acute psychotic episode. Consider psych consult Monday pending clinical response to above. DVT Prophylaxis: lovenox  Diet: Per CVA order set. Code Status: full code. Dispo - PCU. CVA orders and seizure precautions. Moe Varela MD    Thank you Migue Phan MD for the opportunity to be involved in this patient's care.  If you have any questions or concerns please feel free to contact me at [phone].

## 2021-01-03 NOTE — ED NOTES
Patient arrived via squad for AMS and he was found down. His  came today and found him on the floor. The patient is answering most questions appropriately and then he will start talking about needing to go to court for something to do with Joni Bennett from the TV show in the 1950's. He states he fell yesterday and was unable to get up. He does not have a life alert.        Phil Figueroa RN  01/03/21 1200

## 2021-01-03 NOTE — ED NOTES
Attempted to call Ismael Cifuentes and Carlos Klein on the patient contact list, unable to reach anyone.   Ernestina's number is a nursing home and there is no one there by that name     Michael Flores RN  01/03/21 9853

## 2021-01-04 ENCOUNTER — APPOINTMENT (OUTPATIENT)
Dept: MRI IMAGING | Age: 71
DRG: 871 | End: 2021-01-04
Payer: MEDICARE

## 2021-01-04 LAB
ALBUMIN SERPL-MCNC: 2.8 G/DL (ref 3.4–5)
ANION GAP SERPL CALCULATED.3IONS-SCNC: 10 MMOL/L (ref 3–16)
BASOPHILS ABSOLUTE: 0 K/UL (ref 0–0.2)
BASOPHILS RELATIVE PERCENT: 0.1 %
BUN BLDV-MCNC: 31 MG/DL (ref 7–20)
CALCIUM SERPL-MCNC: 8.7 MG/DL (ref 8.3–10.6)
CHLORIDE BLD-SCNC: 108 MMOL/L (ref 99–110)
CHOLESTEROL, TOTAL: 166 MG/DL (ref 0–199)
CO2: 23 MMOL/L (ref 21–32)
CREAT SERPL-MCNC: 0.7 MG/DL (ref 0.8–1.3)
EKG ATRIAL RATE: 64 BPM
EKG DIAGNOSIS: NORMAL
EKG P AXIS: 41 DEGREES
EKG P-R INTERVAL: 102 MS
EKG Q-T INTERVAL: 428 MS
EKG QRS DURATION: 76 MS
EKG QTC CALCULATION (BAZETT): 441 MS
EKG R AXIS: 49 DEGREES
EKG T AXIS: 47 DEGREES
EKG VENTRICULAR RATE: 64 BPM
EOSINOPHILS ABSOLUTE: 0 K/UL (ref 0–0.6)
EOSINOPHILS RELATIVE PERCENT: 0.1 %
ESTIMATED AVERAGE GLUCOSE: 114 MG/DL
GFR AFRICAN AMERICAN: >60
GFR NON-AFRICAN AMERICAN: >60
GLUCOSE BLD-MCNC: 107 MG/DL (ref 70–99)
HBA1C MFR BLD: 5.6 %
HCT VFR BLD CALC: 35.4 % (ref 40.5–52.5)
HDLC SERPL-MCNC: 49 MG/DL (ref 40–60)
HEMOGLOBIN: 11.8 G/DL (ref 13.5–17.5)
LDL CHOLESTEROL CALCULATED: 101 MG/DL
LYMPHOCYTES ABSOLUTE: 0.7 K/UL (ref 1–5.1)
LYMPHOCYTES RELATIVE PERCENT: 5.9 %
MCH RBC QN AUTO: 30.7 PG (ref 26–34)
MCHC RBC AUTO-ENTMCNC: 33.4 G/DL (ref 31–36)
MCV RBC AUTO: 91.7 FL (ref 80–100)
MONOCYTES ABSOLUTE: 1.2 K/UL (ref 0–1.3)
MONOCYTES RELATIVE PERCENT: 9.9 %
NEUTROPHILS ABSOLUTE: 10.3 K/UL (ref 1.7–7.7)
NEUTROPHILS RELATIVE PERCENT: 84 %
PDW BLD-RTO: 13.1 % (ref 12.4–15.4)
PHOSPHORUS: 2.8 MG/DL (ref 2.5–4.9)
PLATELET # BLD: 249 K/UL (ref 135–450)
PMV BLD AUTO: 6.8 FL (ref 5–10.5)
POTASSIUM SERPL-SCNC: 4.1 MMOL/L (ref 3.5–5.1)
RBC # BLD: 3.86 M/UL (ref 4.2–5.9)
SODIUM BLD-SCNC: 141 MMOL/L (ref 136–145)
TOTAL CK: 1315 U/L (ref 39–308)
TRIGL SERPL-MCNC: 78 MG/DL (ref 0–150)
TROPONIN: <0.01 NG/ML
TROPONIN: <0.01 NG/ML
URINE CULTURE, ROUTINE: NORMAL
VANCOMYCIN TROUGH: 6.7 UG/ML (ref 10–20)
VLDLC SERPL CALC-MCNC: 16 MG/DL
WBC # BLD: 12.2 K/UL (ref 4–11)

## 2021-01-04 PROCEDURE — 84484 ASSAY OF TROPONIN QUANT: CPT

## 2021-01-04 PROCEDURE — 82550 ASSAY OF CK (CPK): CPT

## 2021-01-04 PROCEDURE — 97166 OT EVAL MOD COMPLEX 45 MIN: CPT

## 2021-01-04 PROCEDURE — 36415 COLL VENOUS BLD VENIPUNCTURE: CPT

## 2021-01-04 PROCEDURE — 6370000000 HC RX 637 (ALT 250 FOR IP): Performed by: INTERNAL MEDICINE

## 2021-01-04 PROCEDURE — 83036 HEMOGLOBIN GLYCOSYLATED A1C: CPT

## 2021-01-04 PROCEDURE — 6360000002 HC RX W HCPCS: Performed by: INTERNAL MEDICINE

## 2021-01-04 PROCEDURE — 80069 RENAL FUNCTION PANEL: CPT

## 2021-01-04 PROCEDURE — 93970 EXTREMITY STUDY: CPT

## 2021-01-04 PROCEDURE — 85025 COMPLETE CBC W/AUTO DIFF WBC: CPT

## 2021-01-04 PROCEDURE — 97530 THERAPEUTIC ACTIVITIES: CPT

## 2021-01-04 PROCEDURE — 87040 BLOOD CULTURE FOR BACTERIA: CPT

## 2021-01-04 PROCEDURE — 80061 LIPID PANEL: CPT

## 2021-01-04 PROCEDURE — 2580000003 HC RX 258: Performed by: INTERNAL MEDICINE

## 2021-01-04 PROCEDURE — 70551 MRI BRAIN STEM W/O DYE: CPT

## 2021-01-04 PROCEDURE — 93010 ELECTROCARDIOGRAM REPORT: CPT | Performed by: INTERNAL MEDICINE

## 2021-01-04 PROCEDURE — 92610 EVALUATE SWALLOWING FUNCTION: CPT

## 2021-01-04 PROCEDURE — 2060000000 HC ICU INTERMEDIATE R&B

## 2021-01-04 PROCEDURE — 97535 SELF CARE MNGMENT TRAINING: CPT

## 2021-01-04 PROCEDURE — 80202 ASSAY OF VANCOMYCIN: CPT

## 2021-01-04 RX ORDER — ACETAMINOPHEN 325 MG/1
650 TABLET ORAL EVERY 6 HOURS PRN
Status: DISCONTINUED | OUTPATIENT
Start: 2021-01-04 | End: 2021-01-07 | Stop reason: HOSPADM

## 2021-01-04 RX ORDER — LACTOBACILLUS RHAMNOSUS GG 10B CELL
1 CAPSULE ORAL
Status: DISCONTINUED | OUTPATIENT
Start: 2021-01-04 | End: 2021-01-07

## 2021-01-04 RX ORDER — CASTOR OIL AND BALSAM, PERU 788; 87 MG/G; MG/G
OINTMENT TOPICAL 2 TIMES DAILY
Status: DISCONTINUED | OUTPATIENT
Start: 2021-01-04 | End: 2021-01-07 | Stop reason: HOSPADM

## 2021-01-04 RX ADMIN — ASPIRIN 81 MG: 81 TABLET, COATED ORAL at 08:56

## 2021-01-04 RX ADMIN — Medication 10 ML: at 21:27

## 2021-01-04 RX ADMIN — QUETIAPINE FUMARATE 50 MG: 25 TABLET ORAL at 08:52

## 2021-01-04 RX ADMIN — ENOXAPARIN SODIUM 40 MG: 40 INJECTION SUBCUTANEOUS at 08:52

## 2021-01-04 RX ADMIN — CEFEPIME HYDROCHLORIDE 2 G: 2 INJECTION, POWDER, FOR SOLUTION INTRAVENOUS at 21:27

## 2021-01-04 RX ADMIN — CEFEPIME HYDROCHLORIDE 2 G: 2 INJECTION, POWDER, FOR SOLUTION INTRAVENOUS at 10:35

## 2021-01-04 RX ADMIN — VANCOMYCIN HYDROCHLORIDE 1000 MG: 1 INJECTION, POWDER, LYOPHILIZED, FOR SOLUTION INTRAVENOUS at 10:35

## 2021-01-04 RX ADMIN — VANCOMYCIN HYDROCHLORIDE 1000 MG: 1 INJECTION, POWDER, LYOPHILIZED, FOR SOLUTION INTRAVENOUS at 21:26

## 2021-01-04 ASSESSMENT — PAIN SCALES - GENERAL
PAINLEVEL_OUTOF10: 0

## 2021-01-04 NOTE — PLAN OF CARE
During 0845 assessment patient was sitting fully upright with PT at bedside. Provided patient with thickened liquids to assess swallow, patient was able to drink without evidence of cough. Administered aspirin and sequel with small bites of applesauce using chin tuck swallow. As shift progressed at 1130 assessment and vitals patient is sleepy and less conversational.  Stroke scale unchanged from am assessment. Patient's ability assessed to perform self care and independent activity encouraged according to that ability. Assisted with ADL's as needed. skin breakdown assessed. Updated patient that based on speech therapy evaluation he needs another test before a diet can be ordered. He expresses his desire to eat and to go home. Reviewed his need to regain his strength before he can be evaluated to go home.

## 2021-01-04 NOTE — PLAN OF CARE
Problem: Nutrition  Goal: Optimal nutrition therapy  Outcome: Ongoing     Nutrition Problem #1: Inadequate oral intake  Intervention: Food and/or Nutrient Delivery: Continue NPO(with diet adv. when indicated)  Nutritional Goals:  Tolerate most appropriate diet per SLP and MD

## 2021-01-04 NOTE — CONSULTS
Clinical Pharmacy Note  Vancomycin Consult    Ifeoma Teixeira is a 72 y.o. male ordered Vancomycin for sepsis; consult received from Dr. Kinsey Dillon to manage therapy. Also receiving Cefepime. Patient Active Problem List   Diagnosis    Syncope    Localized edema    Mixed hyperlipidemia    Bilateral lower extremity edema    Venous stasis dermatitis of both lower extremities    Acute delirium    Depression with anxiety    Cellulitis of toe of left foot    Rhabdomyolysis       Allergies:  Patient has no known allergies. Temp max:  Temp (24hrs), Av.3 °F (36.8 °C), Min:97.7 °F (36.5 °C), Max:98.8 °F (37.1 °C)      Recent Labs     21  1212   WBC 17.2*       Recent Labs     21  1212   BUN 32*   CREATININE 0.6*       No intake or output data in the 24 hours ending 21 2109      Ht Readings from Last 1 Encounters:   21 5' 10\" (1.778 m)        Wt Readings from Last 1 Encounters:   21 167 lb 5.3 oz (75.9 kg)         Estimated Creatinine Clearance: 120 mL/min (A) (based on SCr of 0.6 mg/dL (L)). Assessment/Plan:  Vancomycin 1000 mg IV every 12 hours ordered. Regimen projects a trough level of 15-20 mg/L. Level ordered for 21 2100. Thank you for the consult.    Carine Cadet [facility],1/3/2021,9:10 PM

## 2021-01-04 NOTE — CARE COORDINATION
Discharge Planning:  NAOMI was able to verify that this pt has two active legal guardians. Verified through [facility] [employer]. Guardian 1.) Cristiano Ellis [phone] of person  Guardian 2.) Salo Little   [phone] of estate    Both guardians are through [employer]. SW left messages for Javier Graham and Ed requesting a return call as at this time, it is recommended that this pt go to a skilled nursing facility upon d/c.      Plan: Awaiting a return call from pts legal guardian. Pt will need SNF placement upon d/c.   Jenniffer Woodard [facility]  [phone]  Electronically signed by Ruben Myles on 1/4/2021 at 3:20 PM    Addendum:  NAOMI received a call from Javier Graham, pts legal guardian. Javier Graham stated that pta pt was living alone at home and was active with [facility] services. Javier Graham stated that she is aware that this pt will require a SNF stay and would like referrals faxed to the following facilities:    · [place]  · [facility]  · [address] [place] [address]      Referrals faxed. SW will await a response.    Jenniffer Woodard [place]  [phone]  Electronically signed by Ruben Myles on 1/4/2021 at 3:28 PM

## 2021-01-04 NOTE — PROGRESS NOTES
Speech Language Pathology  Facility/Department: [address] [place] [address]   CLINICAL BEDSIDE SWALLOW EVALUATION    NAME: Travis Tuttle  : 1950  MRN: [id number]    ADMISSION DATE: 1/3/2021  ADMITTING DIAGNOSIS: Rhabdomyolysis [M62.82]     Travis Tuttle has Syncope; Localized edema; Mixed hyperlipidemia; Bilateral lower extremity edema; Venous stasis dermatitis of both lower extremities; Acute delirium; Depression with anxiety; Cellulitis of toe of left foot; and Rhabdomyolysis on their problem list.    ONSET DATE: 1/3/2021    CHART REVIEW:  1/3/2021 admitted s/p being found down: ADMISSION H&P HPI: The patient is a 72 y.o. male w Hx of substance induced psychiatric disorder, Merkel Cell Ca in remission as of , anxiety, who presents to [facility] after he was found down. Pt is awake, alert and actively hallucinating with paranoid and ideas of grandiose. He is unable to provide any cohesive Hx albeit he knows he is in the Hospital. Per EMS reports his caregiver found him supine on the floor in his apartment with front door open, covered in urine and feces, and called EMS. It is unclear how long he was down. Work up in ED concerning for acute psychotic episode, early sepsis - likely from R 3rd toe infection, and mild rhabdomyolysis. 1/3/2021 CXR: No acute findings    1/3/2021 CT CERVICAL SPINE:   Impression   No acute intracranial abnormality.       No acute fracture of the cervical or lumbar spine. 2021 BRAIN MRI:   Impression   No acute intracranial abnormality.       Mild parenchymal volume loss.       Mild chronic microvascular disease.        Date of Eval: 2021  Evaluating Therapist: Marina Scherer    Current Diet level:  Current Diet : NPO  Current Liquid Diet : NPO      Primary Complaint  Patient Complaint: patient initially denies dysphagia; when asked during assessment, patient does endorse difficulty swallowing that's improved since admission - patient NPO since admission, though; patient appears to be unreliable historian? Pain:  Pain Level: 0    Reason for Referral  Mena Estrada was referred for a bedside swallow evaluation to assess the efficiency of his swallow function, identify signs and symptoms of aspiration and make recommendations regarding safe dietary consistencies, effective compensatory strategies, and safe eating environment. Impression  Dysphagia Diagnosis: Suspected needs further assessment  · Accepted and tolerated evaluation at bedside. · Patient appears lethargic with significant generalized weakness, but demonstrates ability to follow simple dx, respond appropriate but not reliably to questions, oriented x4.    · Oropharyngeal dysphagia noted at bedside characterized by decreased lingula manipulation, suspected delayed swallow initiation, reduced laryngeal elevation when assessed via digital palpation, and suspected reduced pharyngeal peristalsis. Bolus formation and movement with prolonged but adequate with puree and liquids with concern for premature bolus loss to the pharynx with all. · Multiple repeat swallows with all PO trials with concern for uncleared pharyngeal residue with increased risk for penetration/aspiration of residue after the swallow. · No overt signs/symptoms of penetration/aspiration; however, patient without gag reflex during assessment and suspected with decreased sensation and increased potential risk for silent aspiration. Recommend continue NPO with MBS for continued assessment; please write/obtain MD order if in agreement. Dysphagia Outcome Severity Scale: Level 1: Severe dysphagia- NPO. Unable to tolerate any PO safely     Treatment Plan  Requires SLP Intervention: Yes  Duration/Frequency of Treatment: PENDING MBS - suspect ST to tx 3-5 times per week for dysphagia during acute admission  D/C Recommendations:  To be determined    Recommended Diet and Intervention  Diet Solids Recommendation: NPO(PENDING MBS)  Liquid Consistency Recommendation: NPO(PENDING MBS)  Recommended Form of Meds: (PENDING MBS)  Compensatory Swallowing Strategies: (PENDING MBS)    Therapeutic Interventions: (PENDING MBS)      Treatment/Goals  Dysphagia Goals: The patient will tolerate instrumental swallowing procedure    General  Chart Reviewed: Yes  Behavior/Cognition: Cooperative;Lethargic  Communication Observation: Dysarthria  Follows Directions: Simple  Patient Positioning: Upright in bed  Baseline Vocal Quality: Weak;Wet  Volitional Cough: Weak  Volitional Swallow: Delayed  Consistencies Administered: Ice Chips;Dysphagia Pureed (Dysphagia I); Honey - cup;Nectar - cup    Vision/Hearing  Vision Exceptions: Wears glasses at all times  Hearing: Within functional limits    Oral Motor Deficits  Labial ROM: (Reduced)  Labial Strength: Reduced  Labial Coordination: Reduced  Lingual ROM: (Reduced)  Lingual Strength: Reduced  Lingual Coordination: Reduced  Mandible: Impaired  Gag: Absent    Oral Phase Dysfunction  Impaired Mastication: (not assessed, but suspected)  Decreased Anterior to Posterior Transit: All  Suspected Premature Bolus Loss: All     Indicators of Pharyngeal Phase Dysfunction  Delayed Swallow: All  Decreased Laryngeal Elevation: All  Pharyngeal Phase - Comment: high suspicion for reduced pharyngeal peristalsis with likely pharyngeal residue d/t repeat swallows with all PO trials; hard, audible swallow with honey and nectar liquids; no overt signs/symptoms of penetration/aspiration, but patient at increased risk for silent aspiration d/t decreased sensation noted with absent gag reflex; RECOMMEND MBS PRIOR TO CONSIDERING PO DIET    Prognosis  Prognosis for safe diet advancement: guarded  Consulted and agree with results and recommendations: Patient;RN    Education  Patient Education: Completed on results/recs/plan  Patient Education Response: No evidence of learning     Safety Devices in place: Yes  Type of devices:  All fall risk precautions in place         Therapy Time  SLP Individual Minutes  Time In: 1100  Time Out: 121 Merged with [facility]  Minutes: 137 [facility].  Nadia Norris, [address] [facility] [id number]  Speech-Language Pathologist  1/4/2021 11:45 AM

## 2021-01-04 NOTE — PROGRESS NOTES
Occupational Therapy   Occupational Therapy Initial Assessment  Should patient be discharged prior to another treatment session, this note shall serve as the discharge summary. Date: 2021   Patient Name: Mena Estrada  MRN: [id number]     : 1950    Date of Service: 2021    Discharge Recommendations:  3-5 sessions per week, Patient would benefit from continued therapy after discharge       Assessment   Performance deficits / Impairments: Decreased functional mobility ; Decreased safe awareness;Decreased balance;Decreased ADL status; Decreased cognition;Decreased endurance;Decreased high-level IADLs;Decreased strength  Assessment: Pt presents after being found down by POA and covered in stool/urine. Pt with a history of Autism but was living alone with MOWs and HHA every other week to assist with laundry. Pt currently unable to complete transfer from bed, bed mobility with max A and assist for bathing and dressing. Pt not safe for d/c home alone. He would benefit from cont OT to increase independence with self care and functional mobility at lower pace, 3-5 x week. Decision Making: Medium Complexity  History: see above  Exam: mobility, self care  Assistance / Modification: assist of 1-2  OT Education: OT Role;Plan of Care;Transfer Training;ADL Adaptive Strategies;Orientation  Barriers to Learning: autism, delusions  REQUIRES OT FOLLOW UP: Yes  Activity Tolerance  Activity Tolerance: Treatment limited secondary to decreased cognition;Patient limited by fatigue  Safety Devices  Safety Devices in place: Yes  Type of devices: Patient at risk for falls; Left in bed;Bed alarm in place;Nurse notified           Patient Diagnosis(es): The primary encounter diagnosis was Traumatic rhabdomyolysis, initial encounter ([facility] [address] Diagnoses of Dehydration, Urinary tract infection with hematuria, site unspecified, and Delirium were also pertinent to this visit.      has a past medical history of Anxiety, Auditory hallucinations, Cancer ([facility] [address] Hyperlipidemia, Hypokalemia, Merkel cell cancer ([facility] [address] Psychoactive substance-induced organic mood disorder ([facility] [address] and Water retention. has no past surgical history on file. Restrictions  Restrictions/Precautions  Restrictions/Precautions: Fall Risk  Position Activity Restriction  Other position/activity restrictions: IV, telemetry    Subjective   General  Chart Reviewed: Yes, Orders, Progress Notes, History and Physical  Additional Pertinent Hx: Per Dr. Eunice Carter, \"The patient is a 72 y.o. male w Hx of substance induced psychiatric disorder, Merkel Cell Ca in remission as of 2007, anxiety, who presents to [facility] after he was found down. Pt is awake, alert and actively hallucinating with paranoid and ideas of grandiose. He is unable to provide any cohesive Hx albeit he knows he is in the Hospital.  Per EMS reports his caregiver found him supine on the floor in his apartment with front door open, covered in urine and feces, and called EMS. It is unclear how long he was down. Work up in ED concerning for acute psychotic episode, early sepsis - likely from R 3rd toe infection, and mild rhabdomyolysis. \"  Family / Caregiver Present: No  Referring Practitioner: Dr. Eunice Carter  Diagnosis: Rhabdomyosis  Subjective  Subjective: Pt focused on music and getting food. Agreed to OT and to OOB.   No complaints of pain, just hungry    Social/Functional History  Social/Functional History  Lives With: Alone  Type of Home: House  Home Layout: Two level, Laundry in basement(walk in shower in basement; tub on first floor; pt sponge bathes (need to confirm))  Home Access: Stairs to enter with rails  Entrance Stairs - Number of Steps: 4  Entrance Stairs - Rails: Both  Bathroom Shower/Tub: Tub/Shower unit, Walk-in shower  Bathroom Toilet: Standard  Bathroom Equipment: Shower chair  Home Equipment: [address] Help From: Personal care attendant, Other (comment)(probate court assistance  Comment: Pt did not initiate much movement per self  Transfers  Transfer Comments: Attempted stance from EOB but unable to lift off of bed despite min effort of pt. Plan for cotx with PT going forward, likely needs luis manuel robles for first [address]: Wears glasses all the time  Cognition  Overall Cognitive Status: Exceptions  Following Commands: Follows one step commands with increased time; Follows one step commands with repetition  Attention Span: Attends with cues to redirect  Memory: Decreased recall of precautions  Safety Judgement: Decreased awareness of need for assistance;Decreased awareness of need for safety  Problem Solving: Assistance required to generate solutions;Assistance required to implement solutions  Insights: Decreased awareness of deficits  Initiation: Requires cues for all  Cognition Comment: With autism diagnosis and POAs report baseline of delusions and grandiose ideas. LUE AROM (degrees)  LUE General AROM: Unable to fully assess due to limited command following; needs formal assessment when able  RUE AROM (degrees)  RUE General AROM: Unable to fully assess due to limited command following; needs formal assessment when able  LUE Strength  LUE Strength Comment: Unable to fully assess due to limited command following; needs formal assessment when able  RUE Strength  RUE Strength Comment: Unable to fully assess due to limited command following; needs formal assessment when able                   Plan   Plan  Times per week: 3-5  Times per day: Daily  Plan weeks: 1  Current Treatment Recommendations: Strengthening, Endurance Training, Patient/Caregiver Education & Training, Cognitive Reorientation, Self-Care / ADL, Balance Training, Home Management Training, Functional Mobility Training, Safety Education & Training       OutComes Score    Adam Elliott scored a 11/24 on the -[facility] ADL Inpatient form.  Current research shows that an AM-PAC score of 17 or less is typically not associated with a discharge to the patient's home setting. Based on the patient's AM-PAC score and their current ADL deficits, it is recommended that the patient have 3-5 sessions per week of Occupational Therapy at d/c to increase the patient's independence. Please see assessment section for further patient specific details. If patient discharges prior to next session this note will serve as a discharge summary. Please see below for the latest assessment towards goals. AM-PAC Score        AM-[facility] Inpatient Daily Activity Raw Score: 11 (01/04/21 0954)  AM-PAC Inpatient ADL T-Scale Score : 29.04 (01/04/21 0954)  ADL Inpatient CMS 0-100% Score: 70.42 (01/04/21 0954)  ADL Inpatient CMS G-Code Modifier : CL (01/04/21 0476)    Goals  Short term goals  Time Frame for Short term goals: 1 week  Short term goal 1: Pt will be min A with functional transfers  Short term goal 2: Pt will be min A with functional mobility  Short term goal 3: Pt will be min A with bathing and dressing  Short term goal 4: Pt will be min A with bed mobility  Short term goal 5: Pt will be min A with toileting  Long term goals  Time Frame for Long term goals : TBD at next level of care  Patient Goals   Patient goals : Pt repeatedly asking to go home during session.        Therapy Time   Individual Concurrent Group Co-treatment   Time In 0800         Time Out 0915         Minutes 75         Timed Code Treatment Minutes: P.OZurdo Escalante 15, Nannette Avalos

## 2021-01-04 NOTE — PROGRESS NOTES
NAME:  Marta Krause  YOB: 1950  MEDICAL RECORD NUMBER:  [id number]  TODAYS DATE:  1/4/2021    Discussed personal risk factors for Stroke /TIA with patient/family, and ways to reduce the risk for a recurrent stroke. Patient's personal risk factors which were identified are:     [] Alcohol Abuse: check with your physician before any alcohol consumption. [] Atrial fibrillation: may cause blood clots. [] Drug Abuse: Seek help, talk with your doctor  [] Clotting Disorder  [] Diabetes  [x] Family history of stroke or heart disease  [x] High Blood Pressure/Hypertension: work with your physician.  [] High cholesterol: monitor cholesterol levels with your physician. [x] Overweight/Obesity: work with your physician for your ideal body weight. [x] Physical Inactivity: get regular exercise as directed by your physician. [] Personal history of previous TIA or stroke  [x] Poor Diet; decrease salt (sodium) in your diet, follow diet directed by physician. [] Smoking: Cigarette/Cigar: stop smoking. Advised pt. that you can reduce your risk for stroke/TIA by modifying/controlling the risk factors that you have. Pt.advised to take the medications as prescribed, which will be detailed in the discharge instructions, and to not stop taking them without consulting their physician. In addition, pt. advised to maintain a healthy diet, exercise regularly and to not smoke. [facility]'s Stroke treatment and prevention, Managing your recovery  notebook  provided and/or reviewed  with patient/family. The notebook includes, but not limited to, sections addressing warning signs & symptoms of a stroke, which are: sudden numbness or weakness especially on one side of the body, sudden confusion, difficulty speaking or understanding, sudden changes in vision, sudden dizziness or loss of balance/ coordination, or sudden severe headache.   The need to call EMS (911) immediately if signs & symptoms occur is emphasized . The notebook also provides education on Stroke community resources and stroke advocacy. The need for follow-up after discharge was highlighted with patient/family with them being able to repeat understanding of the importance of this.       Electronically signed by Randy Romero RN on 1/4/2021 at 4:22 AM

## 2021-01-04 NOTE — PROGRESS NOTES
Medication Reconciliation    List of medications patient is currently taking is incomplete. I spoke with the patient regarding medications and he could not tell me what he was taking. I tried to contact [facility] POA Renaldo Guevara @ [phone] and left a message.     Pastor Nolan, Pharmacy Intern  1/3/2021 9:00 PM

## 2021-01-04 NOTE — CONSULTS
Comprehensive Nutrition Assessment    Type and Reason for Visit:  Initial, Positive Nutrition Screen, Consult, Wound    Nutrition Recommendations/Plan:     Continue NPO, with diet advancement per SLP and MD    Nutrition Assessment:  Pt is a positive nutrition screen for difficulty chewing/swallowing, poor appetite/intake, wound, and diet education needed. RD also consulted for wound with Hola score of 13 and found down at home. Pt currently NPO with MBS planned for tomorrow. Here with AMS following being found down at home. Also here with traumatic rhabdomyolysis, dehydration, UTI. Will continue to monitor for diet advancement per SLP. If safe for PO, may benefit from an ONS d/t wounds. PMHx includes cancer, anxiety, auditory hallucinations, HLD, water retention. Malnutrition Assessment:  Malnutrition Status:  Insufficient data      Estimated Daily Nutrient Needs:  Energy (kcal):  2827-4350 kcal (25-30 kcal/kg); Weight Used for Energy Requirements:  Current     Protein (g):  90-108g (1.25-1.5 g/kg);  Weight Used for Protein Requirements:  Current        Fluid (ml/day):  or per MD; Method Used for Fluid Requirements:  1 ml/kcal      Nutrition Related Findings:  no BM documented; +1 pitting BLE edema; BUN elevated at 31, creatinine low at 0.7, glucose elevated at 107      Wounds:  Pressure Injury, Stage II(also arterial wound and wound to toe)       Current Nutrition Therapies:    Diet NPO Effective Now    Anthropometric Measures:  · Height: 5' 10\" (177.8 cm)  · Current Body Weight: 157 lb (71.2 kg)   · Admission Body Weight: 167 lb (75.8 kg)(actual)    · Usual Body Weight: (unknown)     · Ideal Body Weight: 166 lbs  · BMI: 22.5  · Adjusted Body Weight:  ; No Adjustment   · BMI Categories: Normal Weight (BMI 22.0 to 24.9) age over 72       Nutrition Diagnosis:   · Inadequate oral intake related to cognitive or neurological impairment as evidenced by NPO or clear liquid status due to medical condition    Nutrition Interventions:   Food and/or Nutrient Delivery:  Continue NPO(with diet adv. when indicated)  Nutrition Education/Counseling:  No recommendation at this time   Coordination of Nutrition Care:  Continue to monitor while inpatient    Goals: Tolerate most appropriate diet per SLP and MD       Nutrition Monitoring and Evaluation:   Behavioral-Environmental Outcomes:  None Identified   Food/Nutrient Intake Outcomes:  Diet Advancement/Tolerance  Physical Signs/Symptoms Outcomes:  Biochemical Data, Chewing or Swallowing, Constipation, Diarrhea, GI Status, Nausea or Vomiting, Meal Time Behavior, Weight, Skin     Discharge Planning:     Too soon to determine     Electronically signed by Tim Chu RD, LD on 1/4/21 at 2:26 PM EST    Contact: [phone]

## 2021-01-04 NOTE — CARE COORDINATION
[address] [address] Continence Nurse  Consult Note       NAME:  Jaison Griffin  MEDICAL RECORD NUMBER:  [id number]  AGE: 72 y.o. GENDER: male  : 1950  TODAY'S DATE:  2021    Subjective   Reason for WOCN Evaluation and Assessment: eschar on toes of right and left foot and on bottom of right foot . Pt was found down at home; unknown length of time. Jaison Griffin is a 72 y.o. male referred by:   [] Physician  [x] Nursing  [] Other:     Wound Identification:  Wound Type: pressure  Contributing Factors: chronic pressure and decreased mobility    Wound History: noted on admit  Current Wound Care Treatment:  Betadine to toes and feet, foam border to sacral area    Patient Goal of Care:  [x] Wound Healing  [] Odor Control  [] Palliative Care  [] Pain Control   [] Other:         PAST MEDICAL HISTORY        Diagnosis Date    Anxiety     Auditory hallucinations     [facility] [address]     Hyperlipidemia     Hypokalemia     Merkel cell cancer ([facility]     Remission in     Psychoactive substance-induced organic mood disorder ([facility]     Water retention        PAST SURGICAL HISTORY    History reviewed. No pertinent surgical history. FAMILY HISTORY    History reviewed. No pertinent family history. SOCIAL HISTORY    Social History     Tobacco Use    Smoking status: Former Smoker    Smokeless tobacco: Never Used   Substance Use Topics    Alcohol use: No    Drug use: No       ALLERGIES    No Known Allergies    MEDICATIONS    No current facility-administered medications on file prior to encounter. Current Outpatient Medications on File Prior to Encounter   Medication Sig Dispense Refill    pravastatin (PRAVACHOL) 40 MG tablet Take 40 mg by mouth daily.       furosemide (LASIX) 40 MG tablet Take 20 mg by mouth daily          Objective    BP 97/62   Pulse 73   Temp 98.4 °F (36.9 °C) (Oral)   Resp 14   Ht 5' 10\" (1.778 m)   Wt 157 lb 13.6 oz (71.6 kg)   SpO2 97%   BMI 22.65 kg/m² LABS:  WBC:    Lab Results   Component Value Date    WBC 12.2 01/04/2021     H/H:    Lab Results   Component Value Date    HGB 11.8 01/04/2021    HCT 35.4 01/04/2021     PTT:  No results found for: APTT, PTT[APTT}  PT/INR:    Lab Results   Component Value Date    PROTIME 11.3 05/17/2017    INR 1.00 05/17/2017     HgBA1c:    Lab Results   Component Value Date    LABA1C 5.6 01/04/2021       Assessment   Hola Risk Score: Hola Scale Score: 13    Patient Active Problem List   Diagnosis Code    Syncope R55    Localized edema R60.0    Mixed hyperlipidemia E78.2    Bilateral lower extremity edema R60.0    Venous stasis dermatitis of both lower extremities I87.2    Acute delirium R41.0    Depression with anxiety F41.8    Cellulitis of toe of left foot L03.032    Rhabdomyolysis M62.82       Measurements:            Wound 01/04/21 Toe (Comment  which one) Anterior; Left (Active)   Wound Image    01/04/21 1200   Wound Etiology Arterial 01/03/21 2045   Dressing Status Clean; Intact 01/04/21 0851   Wound Cleansed Wound cleanser; Soap and water;Betadine/povidone iodine 01/03/21 2045   Dressing/Treatment Betadine swabs/povidone iodine 01/04/21 1200   Dressing Change Due 01/05/21 01/03/21 2045   Wound Assessment Eschar dry 01/04/21 1200   Drainage Amount None 01/04/21 1200   Odor None 01/04/21 1200   Dawn-wound Assessment Other (Comment) 01/04/21 1200   Margins Defined edges 01/04/21 1200   Number of days: 0           Wound 01/04/21 Toe (Comment  which one) Anterior;Right (Active)   Wound Image   01/04/21 1200   Wound Etiology Other 01/04/21 1200   Dressing Status Clean; Intact 01/04/21 0851   Wound Cleansed Betadine/povidone iodine 01/04/21 1200   Dressing/Treatment Betadine swabs/povidone iodine 01/04/21 1200   Dressing Change Due 01/05/21 01/03/21 2045   Wound Length (cm) 0.5 cm 01/04/21 1200   Wound Width (cm) 2 cm 01/04/21 1200   Wound Surface Area (cm^2) 1 cm^2 01/04/21 1200   Wound Assessment Eschar dry 01/04/21 1200   Drainage Amount None 01/04/21 1200   Odor Moderate 01/04/21 1200   Dawn-wound Assessment Edematous; Other (Comment) 01/04/21 1200   Margins Defined edges 01/04/21 1200   Number of days: 0           Wound 01/04/21 Foot Right;Plantar (Active)   Wound Image   01/04/21 1200   Wound Etiology Other 01/04/21 1200   Dressing Status New dressing applied 01/03/21 2045   Wound Cleansed Wound cleanser; Soap and water;Betadine/povidone iodine 01/03/21 2045   Dressing/Treatment Betadine swabs/povidone iodine 01/03/21 2045   Dressing Change Due 01/05/21 01/03/21 2045   Wound Assessment Dry 01/04/21 1200   Drainage Amount None 01/04/21 1200   Odor None 01/04/21 1200   Dawn-wound Assessment Hyperkeratosis (callous); Other (Comment) 01/04/21 1200   Number of days: 0           Wound 01/04/21 Coccyx Left;Dorsal pressure left upper coccyx (Active)   Wound Image   01/04/21 1200   Wound Etiology Pressure Stage  2 01/04/21 1200   Dressing/Treatment Pharmaceutical agent (see MAR) 01/04/21 1200   Dressing Change Due 01/05/21 01/03/21 2045   Wound Length (cm) 6 cm 01/04/21 1200   Wound Width (cm) 9 cm 01/04/21 1200   Wound Depth (cm) 0 cm 01/04/21 1200   Wound Surface Area (cm^2) 54 cm^2 01/04/21 1200   Wound Volume (cm^3) 0 cm^3 01/04/21 1200   Wound Assessment Pink/red;Fluid filled blister 01/04/21 1200   Drainage Amount None 01/03/21 2045   Odor None 01/03/21 2045   Dawn-wound Assessment Intact 01/03/21 2045   Number of days: 0      Pt seen for multiple skin issues. Pt very drowsy, does not open eyes. After care provided, wants to know when his meal is. But then dozes back off to sleep. Pt has pedal edema, pedal pulses are palpable. Does have some evidence of slight hemosiderin staining to legs, L>R. Toe nails are thick and elongated; podiatry consult is pending. Left 3rd toe is black at the tip. Right 3rd toe with wound, uncertain of etiology, but looks like something was wrapped around toe.  Has callous to plantar aspect of right foot.  Sacral area with red area that is starting to blister; suspect may end up being an evolving DTI given hx that pt was found down and +for rhabdo. Scrotum denuded; was found down covered with stool, suspect that is etiology. Response to treatment:  Well tolerated by patient. Pain Assessment:  Severity:  0 / 10      Plan   Plan of Care: Wound 01/04/21 Toe (Comment  which one) Anterior; Left-Dressing/Treatment: Betadine swabs/povidone iodine  Wound 01/04/21 Toe (Comment  which one) Anterior;Right-Dressing/Treatment: Betadine swabs/povidone iodine  [REMOVED] Wound 01/04/21 Toe (Comment  which one) Anterior; Left middle toe-Dressing/Treatment: Open to air  Wound 01/04/21 Foot Right;Plantar-Dressing/Treatment: Betadine swabs/povidone iodine  [REMOVED] Wound 01/04/21 Penis Distal open area, penis-Dressing/Treatment: Open to air  Wound 01/04/21 Coccyx Left;Dorsal pressure left upper coccyx-Dressing/Treatment: Pharmaceutical agent (see MAR)  -venelex barrier to sacral area and scrotum  -elevate heels    Specialty Bed Required : Yes   [x] Low Air Loss   [] Pressure Redistribution  [] Fluid Immersion  [] Bariatric  [] Total Pressure Relief  [] Other:     Current Diet: Diet NPO Effective Now  Dietician consult:  Yes    Discharge Plan:  Placement for patient upon discharge: TBD  Patient appropriate for Outpatient [address]: No    Referrals:  []   [] [facility]  [] Supplies  [] Other    Patient/Caregiver Teaching:  Level of patient/caregiver understanding able to:   [] Indicates understanding       [] Needs reinforcement  [] Unsuccessful      [] Verbal Understanding  [] Demonstrated understanding       [] No evidence of learning  [] Refused teaching         [x] N/A       Electronically signed by ELLA Kingsley on 1/4/2021 at 12:49 PM

## 2021-01-04 NOTE — PROGRESS NOTES
Hospital Medicine Progress Note      Admit Date: 1/3/2021       CC: F/U for found down on the floor - unclear how long    HPI: The patient is a 72 y.o. male w Hx of substance induced psychiatric disorder, Merkel Cell Ca in remission as of 2007, anxiety, who presents to [facility] after he was found down.      Pt is awake, alert and actively hallucinating with paranoid and ideas of grandiose. He is unable to provide any cohesive Hx albeit he knows he is in the [address] [place] [address]  Per EMS reports his caregiver found him supine on the floor in his apartment with front door open, covered in urine and feces, and called EMS. It is unclear how long he was down.         Work up in ED concerning for acute psychotic episode, early sepsis - likely from R 3rd toe infection, and mild rhabdomyolysis. Interval History/Subjective: patient is obtunded. GCS 8 for me. Speech pathogist, Denise Gilmore, was able to get him to participate in bedside swallow eval today, however will need barium swallow tomorrow. blood cultures ordered. WBC 12 down from 17 on adm. Toe cellulitis. See exam. Podiatry on board. Neg DVT bilat. MRI brain:   No acute intracranial abnormality.       Mild parenchymal volume loss.       Mild chronic microvascular disease. Review of Systems:       The patient denied headaches, visual changes, LOC, SOB, CP, ABD pain, N/V/D, skin changes, new or worsening weakness or neuromuscular deficits. Comprehensive ROS negative except as mentioned above. Past Medical History:        Diagnosis Date    Anxiety     Auditory hallucinations     [facility] ([facility] [address]     Hyperlipidemia     Hypokalemia     Merkel cell cancer ([facility] [address] 2007    Remission in 2007    Psychoactive substance-induced organic mood disorder ([facility] [address]     Water retention        Past Surgical History:    History reviewed. No pertinent surgical history. Allergies:  Patient has no known allergies. Past medical and surgical history reviewed. 23 01/04/2021       Lab Results   Component Value Date    MG 2.40 05/21/2017       Lab Results   Component Value Date    ALT 42 (H) 01/03/2021     (H) 01/03/2021    ALKPHOS 75 01/03/2021    BILITOT 0.8 01/03/2021        No flowsheet data found. Lab Results   Component Value Date    LABA1C 5.6 01/04/2021       Imaging:  VL Extremity Venous Bilateral         MRI BRAIN WO CONTRAST   Final Result   No acute intracranial abnormality. Mild parenchymal volume loss. Mild chronic microvascular disease. XR FOOT RIGHT (MIN 3 VIEWS)   Final Result   Degenerative changes and mild diffuse soft tissue edema. No acute fracture. CT HEAD WO CONTRAST   Final Result   No acute intracranial abnormality. No acute fracture of the cervical or lumbar spine. CT CERVICAL SPINE WO CONTRAST   Final Result   No acute intracranial abnormality. No acute fracture of the cervical or lumbar spine. CT LUMBAR SPINE WO CONTRAST   Final Result   No acute intracranial abnormality. No acute fracture of the cervical or lumbar spine. XR CHEST PORTABLE   Final Result   No acute findings         XR PELVIS (1-2 VIEWS)   Final Result   Ossification inferomedial to the left hip joint near the left lesser   trochanter. Findings may be from previous injury or represent soft tissue   calcification. Recommend dedicated radiographs of the left hip for better   characterisation.          FL MODIFIED BARIUM SWALLOW W VIDEO    (Results Pending)       Scheduled and prn Medications:    Scheduled Meds:   lactobacillus  1 capsule Oral Daily with breakfast    Venelex   Topical BID    pravastatin  40 mg Oral Daily    cefepime  2 g Intravenous Q12H    QUEtiapine  50 mg Oral BID    sodium chloride flush  10 mL Intravenous 2 times per day    enoxaparin  40 mg Subcutaneous Daily    aspirin  81 mg Oral Daily    Or    aspirin  300 mg Rectal Daily    influenza virus vaccine  0.5 mL Intramuscular Prior to discharge    vancomycin  1,000 mg Intravenous Q12H    vancomycin (VANCOCIN) intermittent dosing (placeholder)   Other RX Placeholder     Continuous Infusions:   sodium chloride 75 mL/hr at 01/03/21 2226     PRN Meds:.acetaminophen, sodium chloride flush, promethazine **OR** ondansetron, polyethylene glycol    Assessment & Plan:      Metabolic encephalopathy  - likely due to infection  - MRI brain neg for acute findings  - Neuro consult  - EEG ? Early Sepsis secondary to R 3rd toe cellulitis. Severe onychomycosis due to lack of self hygiene. Plan:               - IV vanc and cefepime. - podiatry consult. - consider vascular studies pending response to above. - I did order venous dopplers due to b/l LE edema.         Rhabdomyolysis - due to prolonged immobility. IVF and trend CK.         Fall and found down at home. Etiology not clear. CT head non focal.   Initiated CVA order set. MRI brain no acute findings  Monitor in seizure precautions. Consider EEG and neurology consult Monday. trops serial neg so far        Hx of Psych Disorder substance induced. Pt denies taking anything but his prescribed meds. Urine drug screen neg  Seroquel for symptom control. I suspect he is going through acute psychotic episode. Consider psych consult Monday pending clinical response to above. Home meds:? Unclear on med rec. Will require deep chart review       Continue current regimen/therapies. Monitor. Adjust medical regimen as appropriate. Body mass index is 22.65 kg/m². The patient and / or the family were informed of the results of any tests, a time was given to answer questions, a plan was proposed and they agreed with plan.       DVT ppx: lovenox      Diet: Diet NPO Effective Now    Consults:  IP CONSULT TO HOSPITALIST  PHARMACY TO DOSE VANCOMYCIN  IP CONSULT TO PODIATRY  IP CONSULT TO SOCIAL WORK  IP CONSULT TO PSYCHIATRY    DISPO/placement plan: pending    Code Status: Full Code      JACE Patten CNP  01/04/21

## 2021-01-04 NOTE — PROGRESS NOTES
Physical Therapy    Attempted to see the pt x3 1st time he was in CT scan, then twice this PM he was sound asleep. RN said she gave him Seroquel which made him sleepy. Will try again as condition permits.   Electronically signed by Emely Ramirez PT on 1/4/21 at 3:15 PM EST

## 2021-01-05 ENCOUNTER — APPOINTMENT (OUTPATIENT)
Dept: GENERAL RADIOLOGY | Age: 71
DRG: 871 | End: 2021-01-05
Payer: MEDICARE

## 2021-01-05 LAB
ALBUMIN SERPL-MCNC: 2.6 G/DL (ref 3.4–5)
ANION GAP SERPL CALCULATED.3IONS-SCNC: 9 MMOL/L (ref 3–16)
BASOPHILS ABSOLUTE: 0 K/UL (ref 0–0.2)
BASOPHILS RELATIVE PERCENT: 0.5 %
BUN BLDV-MCNC: 27 MG/DL (ref 7–20)
CALCIUM SERPL-MCNC: 8 MG/DL (ref 8.3–10.6)
CHLORIDE BLD-SCNC: 106 MMOL/L (ref 99–110)
CO2: 25 MMOL/L (ref 21–32)
CREAT SERPL-MCNC: <0.5 MG/DL (ref 0.8–1.3)
EOSINOPHILS ABSOLUTE: 0.1 K/UL (ref 0–0.6)
EOSINOPHILS RELATIVE PERCENT: 1.1 %
GFR AFRICAN AMERICAN: >60
GFR NON-AFRICAN AMERICAN: >60
GLUCOSE BLD-MCNC: 83 MG/DL (ref 70–99)
GLUCOSE BLD-MCNC: 89 MG/DL (ref 70–99)
HCT VFR BLD CALC: 30.7 % (ref 40.5–52.5)
HEMOGLOBIN: 10.5 G/DL (ref 13.5–17.5)
LYMPHOCYTES ABSOLUTE: 0.8 K/UL (ref 1–5.1)
LYMPHOCYTES RELATIVE PERCENT: 13.5 %
MCH RBC QN AUTO: 31.4 PG (ref 26–34)
MCHC RBC AUTO-ENTMCNC: 34 G/DL (ref 31–36)
MCV RBC AUTO: 92.3 FL (ref 80–100)
MONOCYTES ABSOLUTE: 0.7 K/UL (ref 0–1.3)
MONOCYTES RELATIVE PERCENT: 11.7 %
NEUTROPHILS ABSOLUTE: 4.5 K/UL (ref 1.7–7.7)
NEUTROPHILS RELATIVE PERCENT: 73.2 %
PDW BLD-RTO: 12.8 % (ref 12.4–15.4)
PERFORMED ON: NORMAL
PHOSPHORUS: 2.6 MG/DL (ref 2.5–4.9)
PLATELET # BLD: 191 K/UL (ref 135–450)
PMV BLD AUTO: 6.7 FL (ref 5–10.5)
POTASSIUM SERPL-SCNC: 3.8 MMOL/L (ref 3.5–5.1)
RBC # BLD: 3.33 M/UL (ref 4.2–5.9)
SODIUM BLD-SCNC: 140 MMOL/L (ref 136–145)
TOTAL CK: 504 U/L (ref 39–308)
WBC # BLD: 6.1 K/UL (ref 4–11)

## 2021-01-05 PROCEDURE — 92526 ORAL FUNCTION THERAPY: CPT

## 2021-01-05 PROCEDURE — 97530 THERAPEUTIC ACTIVITIES: CPT | Performed by: PHYSICAL THERAPIST

## 2021-01-05 PROCEDURE — 82550 ASSAY OF CK (CPK): CPT

## 2021-01-05 PROCEDURE — 74230 X-RAY XM SWLNG FUNCJ C+: CPT

## 2021-01-05 PROCEDURE — 6370000000 HC RX 637 (ALT 250 FOR IP): Performed by: NURSE PRACTITIONER

## 2021-01-05 PROCEDURE — 6370000000 HC RX 637 (ALT 250 FOR IP): Performed by: INTERNAL MEDICINE

## 2021-01-05 PROCEDURE — 36415 COLL VENOUS BLD VENIPUNCTURE: CPT

## 2021-01-05 PROCEDURE — 92611 MOTION FLUOROSCOPY/SWALLOW: CPT

## 2021-01-05 PROCEDURE — 97129 THER IVNTJ 1ST 15 MIN: CPT

## 2021-01-05 PROCEDURE — 2580000003 HC RX 258: Performed by: NURSE PRACTITIONER

## 2021-01-05 PROCEDURE — 85025 COMPLETE CBC W/AUTO DIFF WBC: CPT

## 2021-01-05 PROCEDURE — 97110 THERAPEUTIC EXERCISES: CPT

## 2021-01-05 PROCEDURE — 2060000000 HC ICU INTERMEDIATE R&B

## 2021-01-05 PROCEDURE — 2580000003 HC RX 258: Performed by: INTERNAL MEDICINE

## 2021-01-05 PROCEDURE — 94760 N-INVAS EAR/PLS OXIMETRY 1: CPT

## 2021-01-05 PROCEDURE — 97530 THERAPEUTIC ACTIVITIES: CPT

## 2021-01-05 PROCEDURE — 97163 PT EVAL HIGH COMPLEX 45 MIN: CPT | Performed by: PHYSICAL THERAPIST

## 2021-01-05 PROCEDURE — 97535 SELF CARE MNGMENT TRAINING: CPT

## 2021-01-05 PROCEDURE — 80069 RENAL FUNCTION PANEL: CPT

## 2021-01-05 PROCEDURE — 6360000002 HC RX W HCPCS: Performed by: INTERNAL MEDICINE

## 2021-01-05 RX ADMIN — SODIUM CHLORIDE 100 ML/HR: 9 INJECTION, SOLUTION INTRAVENOUS at 04:09

## 2021-01-05 RX ADMIN — CEFEPIME HYDROCHLORIDE 2 G: 2 INJECTION, POWDER, FOR SOLUTION INTRAVENOUS at 09:30

## 2021-01-05 RX ADMIN — CASTOR OIL AND BALSAM, PERU: 788; 87 OINTMENT TOPICAL at 02:56

## 2021-01-05 RX ADMIN — ASPIRIN 300 MG: 300 SUPPOSITORY RECTAL at 09:20

## 2021-01-05 RX ADMIN — CEFEPIME HYDROCHLORIDE 2 G: 2 INJECTION, POWDER, FOR SOLUTION INTRAVENOUS at 21:36

## 2021-01-05 RX ADMIN — CASTOR OIL AND BALSAM, PERU: 788; 87 OINTMENT TOPICAL at 09:20

## 2021-01-05 RX ADMIN — CASTOR OIL AND BALSAM, PERU: 788; 87 OINTMENT TOPICAL at 21:43

## 2021-01-05 RX ADMIN — Medication 1250 MG: at 22:07

## 2021-01-05 RX ADMIN — Medication 1250 MG: at 09:30

## 2021-01-05 RX ADMIN — ENOXAPARIN SODIUM 40 MG: 40 INJECTION SUBCUTANEOUS at 09:20

## 2021-01-05 ASSESSMENT — PAIN SCALES - GENERAL
PAINLEVEL_OUTOF10: 0
PAINLEVEL_OUTOF10: 0

## 2021-01-05 NOTE — PROGRESS NOTES
Occupational Therapy  Facility/Department: [facility]N PROGRESSIVE CARE  Daily Treatment Note  NAME: Zeus Wyman  : 1950  MRN: [id number]    Date of Service: 2021    Discharge Recommendations:  3-5 sessions per week, Patient would benefit from continued therapy after discharge     Zeus Wyman scored a  on the AM-PAC ADL Inpatient form. Current research shows that an AM-PAC score of 17 or less is typically not associated with a discharge to the patient's home setting. Based on the patient's AM-PAC score and their current ADL deficits, it is recommended that the patient have 3-5 sessions per week of Occupational Therapy at d/c to increase the patient's independence. Please see assessment section for further patient specific details. If patient discharges prior to next session this note will serve as a discharge summary. Please see below for the latest assessment towards goals. Assessment   Performance deficits / Impairments: Decreased functional mobility ; Decreased safe awareness;Decreased balance;Decreased ADL status; Decreased cognition;Decreased endurance;Decreased high-level IADLs;Decreased strength  Assessment: Discussed with OTR: Pt focused on wanting to eat, go home this session. Pt NPO for MBS test this date. Pt Max/dep for bed mob, Max A x2 for sit><stands at walker, with posterior lean and max A x2 for safe upright balance. Pt Max/dep for LB ADL's (dressing LB this date). Pt is limited by the above deficits and unsafe to return home (see PTA hx below-pt with hx of Autism). Recommend low-mod frequency therapy at d/c. Cont poc. History: Pt presents after being found down by POA and covered in stool/urine. Pt with a history of Autism but was living alone with MOWs and HHA every other week to assist with laundry.     OT Education: OT Role;Plan of Care;Transfer Training;ADL Adaptive Strategies;Orientation  Barriers to Learning: autism, delusions  REQUIRES OT FOLLOW UP: \"go home\". Pt seen with PT for co-tx. Orientation  Orientation  Overall Orientation Status: Impaired  Orientation Level: Oriented to person;Oriented to time;Oriented to place; Disoriented to situation  Objective    ADL  LE Dressing: Dependent/Total(threaded briefs while in bed, pt lifting LE's minimally to assist. Assist x2 in stance to pull over hips.)        Balance  Sitting Balance: Minimal assistance(to CGA and pt leaning posteriorly)  Standing Balance  Time: 1-2 min  Activity: Static stance at walker at EOB, with Max A x2 and pt leaning posteriorly  Wheelchair Bed Transfers  Wheelchair/Bed - Technique: Lateral  Level of Asssistance: Dependent/Total  Wheelchair Transfers Comments: bed to stretcher for test, at end of tx, Dep x4 assist, with maxi slide  Bed mobility  Rolling to Left: Maximum assistance  Rolling to Right: Maximum assistance  Supine to Sit: Maximum assistance;2 Person assistance  Sit to Supine: Maximum assistance;2 Person assistance  Scooting: Maximal assistance;2 Person assistance  Transfers  Sit to stand: Maximum assistance;2 Person assistance  Stand to sit: Maximum assistance;2 Person assistance  Transfer Comments: sit><stand at bed at walker. Pt unable to follow instructions to push up from bed. Pt pulled on walker to stand. Cognition  Overall Cognitive Status: Exceptions  Following Commands: Follows one step commands with increased time; Follows one step commands with repetition  Attention Span: Attends with cues to redirect  Memory: Decreased recall of precautions  Safety Judgement: Decreased awareness of need for assistance;Decreased awareness of need for safety  Problem Solving: Assistance required to generate solutions;Assistance required to implement solutions  Insights: Decreased awareness of deficits  Initiation: Requires cues for all  Cognition Comment: With autism diagnosis and POAs report baseline of delusions and grandiose ideas.          Type of ROM/Therapeutic Exercise  Type of ROM/Therapeutic Exercise: AAROM  Comment: BUe's ex's with AAROM, as pt difficulty following instructions, very focused on wanting to eat, go home. Exercises  Shoulder Flexion: x10, More PROM to achieve full range  Shoulder Extension: x10  Elbow Flexion: x10  Elbow Extension: x10      Plan   Plan  Times per week: 3-5  Times per day: Daily  Plan weeks: 1  Current Treatment Recommendations: Strengthening, Endurance Training, Patient/Caregiver Education & Training, Cognitive Reorientation, Self-Care / ADL, Balance Training, Home Management Training, Functional Mobility Training, Safety Education & Training    AM-PAC Score        AM-[facility] Inpatient Daily Activity Raw Score: 11 (01/05/21 1006)  AM-PAC Inpatient ADL T-Scale Score : 29.04 (01/05/21 1006)  ADL Inpatient CMS 0-100% Score: 70.42 (01/05/21 1006)  ADL Inpatient CMS G-Code Modifier : CL (01/05/21 1006)    Goals  Short term goals  Time Frame for Short term goals: 1 week. status: goals ongoing  Short term goal 1: Pt will be min A with functional transfers  Short term goal 2: Pt will be min A with functional mobility  Short term goal 3: Pt will be min A with bathing and dressing  Short term goal 4: Pt will be min A with bed mobility  Short term goal 5: Pt will be min A with toileting  Long term goals  Time Frame for Long term goals : TBD at next level of care  Patient Goals   Patient goals : Pt repeatedly asking to go home during session.        Therapy Time   Individual Concurrent Group Co-treatment   Time In 0915         Time Out 1000         Minutes 0810 [facility] [place] [facility]\""

## 2021-01-05 NOTE — PROGRESS NOTES
Physical Therapy    Facility/Department: [facility] 9K PROGRESSIVE CARE  Initial Assessment    NAME: Alethea Page  : 1950  MRN: [id number]    Date of Service: 2021    Discharge Recommendations:  Continue to assess pending progress, Patient would benefit from continued therapy after discharge, 3-5 sessions per week   PT Equipment Recommendations  Equipment Needed: No  Other: Has cane    Alethea Page scored a / on the AM-PAC short mobility form. Current research shows that an AM-PAC score of 17 or less is typically not associated with a discharge to the patient's home setting. Based on the patient's AM-PAC score and their current functional mobility deficits, it is recommended that the patient have 3-5 sessions per week of Physical Therapy at d/c to increase the patient's independence. Please see assessment section for further patient specific details. If patient discharges prior to next session this note will serve as a discharge summary. Please see below for the latest assessment towards goals. Assessment   Body structures, Functions, Activity limitations: Decreased functional mobility ; Decreased safe awareness;Decreased balance;Decreased cognition;Decreased ROM; Decreased endurance;Decreased strength;Decreased posture  Assessment: Per Dr Jude Luz \"The patient is a 72 y.o. male w Hx of substance induced psychiatric disorder, Merkel Cell Ca in remission as of , anxiety, who presents to [facility] after he was found down. Pt is awake, alert and actively hallucinating with paranoid and ideas of grandiose. He is unable to provide any cohesive Hx albeit he knows he is in the Hospital. Per EMS reports his caregiver found him supine on the floor in his apartment with front door open, covered in urine and feces, and called EMS. It is unclear how long he was down.  Work up in ED concerning for acute psychotic episode, early sepsis - likely from R 3rd toe infection, and mild rhabdomyolysis. Pt needs Max A x2 for bed mobility, transfers and is unable to ambulate, he does not initiate physical tasks and is fixated on eating. He wants to go home but is unsafe at this time as he lives alone. He is functioning well below his PLOF so recommend skilled PT 3-5 x week post D/C. Will continue to follow. Treatment Diagnosis: Decreased Functional mobility  Prognosis: Fair  Decision Making: High Complexity  History: Per Dr Jessica Frost \"The patient is a 72 y.o. male w Hx of substance induced psychiatric disorder, Merkel Cell Ca in remission as of 2007, anxiety, who presents to [facility] after he was found down. Pt is awake, alert and actively hallucinating with paranoid and ideas of grandiose. He is unable to provide any cohesive Hx albeit he knows he is in the Hospital. Per EMS reports his caregiver found him supine on the floor in his apartment with front door open, covered in urine and feces, and called EMS. It is unclear how long he was down. Work up in ED concerning for acute psychotic episode, early sepsis - likely from R 3rd toe infection, and mild rhabdomyolysis  Exam: Pt needs Max A x2 for bed mobility, transfers and is unable to ambulate, he does not initiate physical tasks and is fixated on eating. He wants to go home but is unsafe at this time as he lives alone. He is functioning well below his PLOF so recommend skilled PT 3-5 x week post D/C. Will continue to follow. Clinical Presentation: Unstable  PT Education: PT Role;Plan of Care;Transfer Training; Adaptive Device Training;Gait Training;General Safety; Functional Mobility Training;Orientation;Goals  Patient Education: Purpose of swallow exam  Barriers to Learning: Cognition, behavior  REQUIRES PT FOLLOW UP: Yes  Activity Tolerance  Activity Tolerance: Patient limited by endurance; Patient limited by fatigue;Patient limited by cognitive status       Patient Diagnosis(es): The primary encounter diagnosis was Traumatic rhabdomyolysis, initial encounter ([facility] [address] Diagnoses of Dehydration, Urinary tract infection with hematuria, site unspecified, and Delirium were also pertinent to this visit. has a past medical history of Anxiety, Auditory hallucinations, Cancer ([facility] [address] Hyperlipidemia, Hypokalemia, Merkel cell cancer ([facility] [address] Psychoactive substance-induced organic mood disorder ([facility] [address] and Water retention. has no past surgical history on file. Restrictions  Restrictions/Precautions  Restrictions/Precautions: Fall Risk  Position Activity Restriction  Other position/activity restrictions: IV, telemetry; steward  Vision/Hearing  Vision: Impaired  Vision Exceptions: Wears glasses at all times  Hearing: Within functional limits     Subjective  General  Chart Reviewed: Yes  Patient assessed for rehabilitation services?: Yes  Additional Pertinent Hx: Per Dr Chelsea Chakraborty \"The patient is a 72 y.o. male w Hx of substance induced psychiatric disorder, Merkel Cell Ca in remission as of 2007, anxiety, who presents to [facility] after he was found down. Pt is awake, alert and actively hallucinating with paranoid and ideas of grandiose. He is unable to provide any cohesive Hx albeit he knows he is in the Hospital.  Per EMS reports his caregiver found him supine on the floor in his apartment with front door open, covered in urine and feces, and called EMS. It is unclear how long he was down. Work up in ED concerning for acute psychotic episode, early sepsis - likely from R 3rd toe infection, and mild rhabdomyolysis. Response To Previous Treatment: Not applicable  Family / Caregiver Present: No  Referring Practitioner: See Alanis MD  Referral Date : 01/03/21  Diagnosis: Rhabdomylosis  Follows Commands: Impaired  Subjective  Subjective: Pt upset that he is not allowed to eat and this was his main focus during the session with repeated redirection and explaination why he can't eat.   Pain Screening  Patient Currently in Pain: No    Orientation  Orientation  Overall Orientation Status: Impaired(Not accurate for time)  Orientation Level: Oriented to person;Oriented to place; Disoriented to situation;Disoriented to time  Social/Functional History  Social/Functional History  Lives With: Alone  Type of Home: House  Home Layout: Two level, Laundry in basement(walk in shower in basement; tub on first floor; pt sponge bathes (need to confirm))  Home Access: Stairs to enter with rails  Entrance Stairs - Number of Steps: 4  Entrance Stairs - Rails: Both  Bathroom Shower/Tub: Tub/Shower unit, Walk-in shower  Bathroom Toilet: Standard  Bathroom Equipment: Shower chair  Home Equipment: [employer] Help From: Personal care attendant, Other (comment)(probate court appointed HCPOA and Financial POA)  ADL Assistance: Independent  Homemaking Assistance: Needs assistance(laundry and MOWs)  Active : No(license taken away per pt after accident a couple years ago)  Type of occupation: Huge fan of music, loves to talk about music. Some delusions (going to court with Mick Elder today)  Additional Comments: every other week HHA for laundry and bring food or cook food; MOWs  Cognition   Cognition  Overall Cognitive Status: Exceptions  Arousal/Alertness: Delayed responses to stimuli;Inconsistent responses to stimuli  Following Commands: Follows one step commands with increased time; Follows one step commands with repetition  Attention Span: Attends with cues to redirect  Memory: Decreased recall of precautions;Decreased short term memory  Safety Judgement: Decreased awareness of need for assistance;Decreased awareness of need for safety  Problem Solving: Assistance required to generate solutions;Assistance required to implement solutions;Decreased awareness of errors  Insights: Not aware of deficits  Initiation: Requires cues for all  Sequencing: Requires cues for some  Cognition Comment: POAs report baseline of delusions and grandiose ideas.  Fixated on not being allowed to eat-says he swallows fine    Objective   PROM RLE (degrees)  RLE PROM: WFL  RLE General PROM: LEs very stiff lacks full knee and hip extension. PROM LLE (degrees)  LLE PROM: WFL  LLE General PROM: LEs very stiff lacks full knee and hip extension. Strength RLE  Strength RLE: WFL  Comment: Unable to follow directions for MMT at least 3/5  Strength LLE  Strength LLE: WFL  Comment: Unable to follow directions for MMT at least 3/5     Bed mobility  Rolling to Left: Maximum assistance  Rolling to Right: Maximum assistance  Supine to Sit: Maximum assistance;2 Person assistance  Sit to Supine: Maximum assistance;2 Person assistance  Comment: Pt did not initiate movement  Transfers  Sit to Stand: Maximum Assistance;2 Person Assistance(RW)  Stand to sit: Maximum Assistance;2 Person Assistance  Comment: Max cues to push off bed but the end he could only stand pulling up on RW  Ambulation  Ambulation?: No  Stairs/Curb  Stairs?: No     Balance  Posture: Fair  Sitting - Static: Fair;-  Sitting - Dynamic: Poor;+  Standing - Static: Poor  Standing - Dynamic: Poor  Comments: CG/Rey sitting on EOB R and post lean, unanle to stand w/o RW and Max A stood 1-2 min with flexed posture and post lean with RW        Plan   Plan  Times per week: 3-5  Current Treatment Recommendations: Strengthening, Transfer Training, Endurance Training, Cognitive Reorientation, Balance Training, Gait Training, Functional Mobility Training, Safety Education & Training  Safety Devices  Type of devices: All fall risk precautions in place, Gait belt, Patient at risk for falls(Transporter took pt to swallow exam)    AM-PAC Score  AM-PAC Inpatient Mobility Raw Score : 6 (01/05/21 1050)  AM-PAC Inpatient T-Scale Score : 23.55 (01/05/21 1050)  Mobility Inpatient CMS 0-100% Score: 100 (01/05/21 1050)  Mobility Inpatient CMS G-Code Modifier : CN (01/05/21 1050)          Goals  Short term goals  Time Frame for Short term goals:  Within acute stay  Short term goal 1: Bed mobility with min/modA  Short term goal 2: Transfers with min/mod A  Short term goal 3: Amb 48' with RW and min/modA  Patient Goals   Patient goals : I want to go home       Therapy Time   Individual Concurrent Group Co-treatment   Time In 0920         Time Out 1005         Minutes 45         Timed Code Treatment Minutes: [address], [address] [address] [place] [address]

## 2021-01-05 NOTE — PROCEDURES
INSTRUMENTAL SWALLOW REPORT  MODIFIED BARIUM SWALLOW    NAME: Nely Jacobs   : 1950  MRN: [id number]       Date of Eval: 2021     Ordering Physician: Dr. Fletcher Meraz  Radiologist: Dr. Audrey Belle Diagnosis: The primary encounter diagnosis was Traumatic rhabdomyolysis, initial encounter [facility]). Diagnoses of Dehydration, Urinary tract infection with hematuria, site unspecified, and Delirium were also pertinent to this visit. Onset 1/3/2021    Referring Diagnosis(es):  Oropharyngeal Dysphagia  · Type of Study: Initial MBS    Past Medical History:  has a past medical history of Anxiety, Auditory hallucinations, Cancer ([facility] [address] Hyperlipidemia, Hypokalemia, Merkel cell cancer ([facility] [address] Psychoactive substance-induced organic mood disorder ([facility] [address] and Water retention. Past Surgical History:  has no past surgical history on file. Current Diet  Current Diet Solid Consistency: NPO  Current Diet Liquid Consistency: NPO    Recent CXR/CT of Chest: 1/3/2021 : \"no acute findings\"  2021 Brain MRI: \"no acute intracranial abnormality; mild parenchymal volume los; mild chronic microvascular disease\"    Patient Complaints/Reason for Referral:  Nely Jacobs was referred for a MBS to assess the efficiency of his/her swallow function, assess for aspiration, and to make recommendations regarding safe dietary consistencies, effective compensatory strategies, and safe eating environment. Assessment Impressions:  1. Behavior/Cognition/Vision/Hearing:  · Behavior/Cognition: Alert; Cooperative;Pleasant mood;Confused; Distractible(Pt was oriented to self and partially to place. Pt was verbally responsive but redundancy of ideas/questions.   Pt followed simple commands;but max physical cues for compensatory head posture strategies; and verbal cues/re-redirect cues warranted)  · Vision:  Wears glasses at all times/ Hearing: Within functional limits  · No pain complaints  · No O2 needs  ·  Patient Position: Lateral and Patient Degrees: Upright in Videofluoroscopic Chair Consistencies Administered: Dysphagia Minced and Moist (Dysphagia II); Dysphagia Pureed (Dysphagia I); Honey cup;Nectar cup;Nectar  teaspoon; Thin cup; Thin straw    2. Moderate to Marked Oropharyngeal Dysphagia characterized by anterior munching /mashing mastication pattern; poor lingual coordination for bolus control ; delayed palatal trigger; delayed initiation of swallow; reduced hyolaryngeal excursion; reduced tongue base retraction and reduced pharyngeal clearance. Pt with poor sensation of pharyngeal residue or frequent penetration without clearance which pooled to level of VC. Symptoms:   · Premature loss to pharynx across consistencies  · Pooling to the valleculae prior to the swallow  · Episodes of deep penetration of thin liquids before/during the swallow without clearance and without pt sensation  · Episodes of penetration of pharyngeal residue post swallow without pt sensation. · Severity of pharyngeal residue increases as viscosity of item increases. Pharyngeal residue in the valleculae > pharyngeal wall and pyriform sinus  Compensatory strategies:   · No improvement with chin tuck ; head rotation right or head rotation left. Pt required max verbal/physical cues for compensatory head postures. · volitional cough: strong but did not completely clear penetration  · Repeat clearance swallows persistent penetration of pharyngeal residue   Recommend npo and referral back to MD for nutrition mode as this SLP is unable to r/o aspiration if made po. Dysphagia Outcome Severity Scale: Level 1: Severe dysphagia- NPO. Unable to tolerate any PO safely    Penetration-Aspiration Scale (PAS): 5 - Material enters the airway, contacts the vocal folds, and is not ejected into the airway(noted across presentations. No pt sensation.  High risk for eventual aspiration)    Recommended Diet:  Solid consistency: NPO  Liquid consistency: NPO  Medication Elevation: All  Pooling Valleculae: All  Reduced Tongue Base: All  Shallow Penetration After: (noted across consistencies of pharyngeal residue when attempting to clear with clearance swallow or by alternating with liquids)  Parital Self-clearing (shallow): (no complete clearance)  Deep Penetration Before: (inconsistent thin)  Deep Penetration During: (inconsistent thin)  Deep Penetration After: (noted across consistencies of pharyngeal residue when attempting to clear with clearance swallow or by alternating with liquids)  Partial Retrieval (deep): All  Aspiration During: (high risk after the swallow due to poor pt sensation of pharyngeal residue and persistent penetration of pharyngeal residue or thin liquid residue post swallow)  Pharyngeal Residue - Valleculae: All(becomes moderate + post swallow as viscosity of items increase)  Pharyngeal Residue - Pyriform: All  Pharyngeal Residue - Posterior Pharynx: (trace barium lines pharyngeal wall)      Esophageal Phase  Esophageal Screen: (DNT)      Therapy Time:   Individual   Time In 1004   Time Out 1045   Minutes 41    coded treatment time: 13       Signed  Arin TerrazasMS,[phone]  Speech and Language Pathologist   1/5/2021, 10:54 AM

## 2021-01-05 NOTE — CONSULTS
Per my d/w NP Blied, this clinical picture seems most c/w delirium. Pt is improved today, in her opinion. With her assent, I'll defer this consult for now, though the team is absolutely welcome to re-consult if it turns out this clinical picture is not consistent with delirium after medical issues improve and time helps heal pt's brain chemistry.

## 2021-01-05 NOTE — PROGRESS NOTES
Hospital Medicine Progress Note      Admit Date: 1/3/2021       CC: F/U for found down on floor - unclear how long    HPI: The patient is a 80 y. o. male w Hx of substance induced psychiatric disorder, Merkel Cell Ca in remission as of 2007, anxiety, who presents to [facility] he was found down.      Pt is awake, alert and actively hallucinating with paranoid and ideas of grandiose. He is unable to provide any cohesive Hx albeit he knows he is in the [address] [place] [address]  Per EMS reports his caregiver found him supine on the floor in his apartment with front door open, covered in urine and feces, and called EMS. It is unclear how long he was down.         Work up in ED concerning for acute psychotic episode, early sepsis - likely from R 3rd toe infection, and mild rhabdomyolysis.       1/4:  patient is obtunded. GCS 8 for me. Speech pathogist, Amanda Langford, was able to get him to participate in bedside swallow eval today, however will need barium swallow tomorrow. blood cultures ordered. WBC 12 down from 17 on adm. Toe cellulitis. See exam. Podiatry on board. Neg DVT bilat.     MRI brain:   No acute intracranial abnormality.       Mild parenchymal volume loss.       Mild chronic microvascular disease.          Interval History/Subjective: awake and alert today but confused. States he is planning on going back home to live independently, but scored a 6/24 on his AM-PAC. He has a legal guardian POA. He did not do well with his barium swallow eval so need to engage Palliative Care with discussion with the POA on nutrition/feeding tubes, etc., at this point. Will need SNF placement on d/c. He is likely experiencing delirium from the toe infection he has on his right foot. pics in the chart. Podiatry is being consulted. And he is currently receiving IV antibiotics for this. He was admitted with rhabdo from dehydration and being down on admission. CK is improving with IVF.   Will defer Psych consult for now in the setting of his alertness at least for now and obvious infection. If his status changes mentally, we can re-engage them. I feel his current worsening confusion is likely d/t acute infection of his toe complicated by dehydration. He is already showing improvement with meds here. Awaiting recs from Podiatry. Cont current antibx in the meantime.  -------------  POA wishes to seek Palliative care consult and possibly hospice. Consult placed. Requiring wrist restraints for spontaneous behavior and pulling at lines,etc. For safety issues. Review of Systems:       The patient denied headaches, visual changes, LOC, SOB, CP, ABD pain, N/V/D, skin changes, new or worsening weakness or neuromuscular deficits. Comprehensive ROS negative except as mentioned above. Past Medical History:        Diagnosis Date    Anxiety     Auditory hallucinations     Cancer ([facility] [address]     Hyperlipidemia     Hypokalemia     Merkel cell cancer ([facility] [address] 2007    Remission in 2007    Psychoactive substance-induced organic mood disorder ([facility] [address]     Water retention        Past Surgical History:    History reviewed. No pertinent surgical history. Allergies:  Patient has no known allergies. Past medical and surgical history reviewed. Any changes have been noted. PHYSICAL EXAM:  /73   Pulse 65   Temp 98.2 °F (36.8 °C) (Oral)   Resp 20   Ht 5' 10\" (1.778 m)   Wt 159 lb 6.3 oz (72.3 kg)   SpO2 98%   BMI 22.87 kg/m²       Intake/Output Summary (Last 24 hours) at 1/5/2021 1127  Last data filed at 1/5/2021 0557  Gross per 24 hour   Intake 3449.4 ml   Output 775 ml   Net 2674.4 ml      General appearance: disheveled and unkempt, No apparent distress appears stated age. HEENT Normal cephalic, atraumatic without obvious deformity.  Pupils equal, round, and reactive to light, pinpoint. EOMi Conjunctivae/corneas clear.   Neck: Supple, No jugular venous distention/bruits.  Trachea midline without thyromegaly or adenopathy with full range of motion. Lungs: Clear to auscultation, bilaterally without Rales/Wheezes/Rhonchi with good respiratory effort. Heart: Regular rate and rhythm with Normal S1/S2 without murmurs, rubs or gallops, point of maximum impulse non-displaced  Abdomen: Soft, non-tender or non-distended without rigidity or guarding and positive bowel sounds all four quadrants. Extremities:   Extreme case of onychomycosis and also R 3rd toe with changes of cellulitis spreading to foot.   Neurologic:follows commands, confused speech at times with answering questions, alert, chatty, definitely brighter today  Capillary Refill: Acceptable  < 3 seconds  Peripheral Pulses: +3 Easily felt, not easily obliterated with pressure                      LABS:    Lab Results   Component Value Date    WBC 6.1 01/05/2021    HGB 10.5 (L) 01/05/2021    HCT 30.7 (L) 01/05/2021    MCV 92.3 01/05/2021     01/05/2021    LYMPHOPCT 13.5 01/05/2021    RBC 3.33 (L) 01/05/2021    MCH 31.4 01/05/2021    MCHC 34.0 01/05/2021    RDW 12.8 01/05/2021       Lab Results   Component Value Date    CREATININE <0.5 (L) 01/05/2021    BUN 27 (H) 01/05/2021     01/05/2021    K 3.8 01/05/2021     01/05/2021    CO2 25 01/05/2021       Lab Results   Component Value Date    MG 2.40 05/21/2017       Lab Results   Component Value Date    ALT 42 (H) 01/03/2021     (H) 01/03/2021    ALKPHOS 75 01/03/2021    BILITOT 0.8 01/03/2021        No flowsheet data found. Lab Results   Component Value Date    LABA1C 5.6 01/04/2021       Imaging:  FL MODIFIED BARIUM SWALLOW W VIDEO   Final Result   Significant pooling of administered consistencies with subsequent laryngeal   penetration. Please see separate speech pathology report for full discussion of findings   and recommendations. VL Extremity Venous Bilateral   Final Result      MRI BRAIN WO CONTRAST   Final Result   No acute intracranial abnormality. Mild parenchymal volume loss. Mild chronic microvascular disease. XR FOOT RIGHT (MIN 3 VIEWS)   Final Result   Degenerative changes and mild diffuse soft tissue edema. No acute fracture. CT HEAD WO CONTRAST   Final Result   No acute intracranial abnormality. No acute fracture of the cervical or lumbar spine. CT CERVICAL SPINE WO CONTRAST   Final Result   No acute intracranial abnormality. No acute fracture of the cervical or lumbar spine. CT LUMBAR SPINE WO CONTRAST   Final Result   No acute intracranial abnormality. No acute fracture of the cervical or lumbar spine. XR CHEST PORTABLE   Final Result   No acute findings         XR PELVIS (1-2 VIEWS)   Final Result   Ossification inferomedial to the left hip joint near the left lesser   trochanter. Findings may be from previous injury or represent soft tissue   calcification. Recommend dedicated radiographs of the left hip for better   characterisation.              Scheduled and prn Medications:    Scheduled Meds:   vancomycin  1,250 mg Intravenous Q12H    [Held by provider] lactobacillus  1 capsule Oral Daily with breakfast    Venelex   Topical BID    [Held by provider] pravastatin  40 mg Oral Daily    cefepime  2 g Intravenous Q12H    [Held by provider] QUEtiapine  50 mg Oral BID    sodium chloride flush  10 mL Intravenous 2 times per day    enoxaparin  40 mg Subcutaneous Daily    aspirin  81 mg Oral Daily    Or    aspirin  300 mg Rectal Daily    influenza virus vaccine  0.5 mL Intramuscular Prior to discharge    vancomycin (VANCOCIN) intermittent dosing (placeholder)   Other RX Placeholder     Continuous Infusions:   sodium chloride 100 mL/hr (01/05/21 7952)     PRN Meds:.acetaminophen, sodium chloride flush, promethazine **OR** ondansetron, polyethylene glycol    Assessment & Plan:      Metabolic encephalopathy  - likely due to infection  - MRI brain neg for acute findings  - Neuro consult  - EEG ?     Early Sepsis

## 2021-01-05 NOTE — PROGRESS NOTES
Clinical Pharmacy Note  Vancomycin Consult    Regina Heath is a 72 y.o. male ordered Vancomycin for sepsis; consult received from Dr. Marla Oscar to manage therapy. Also receiving Cefepime. Patient Active Problem List   Diagnosis    Syncope    Localized edema    Mixed hyperlipidemia    Bilateral lower extremity edema    Venous stasis dermatitis of both lower extremities    Acute delirium    Depression with anxiety    Cellulitis of toe of left foot    Rhabdomyolysis       Allergies:  Patient has no known allergies. Temp max:  Temp (24hrs), Av.1 °F (36.7 °C), Min:97.8 °F (36.6 °C), Max:98.4 °F (36.9 °C)      Recent Labs     21  1212 21  0436   WBC 17.2* 12.2*       Recent Labs     21  1212 21  0436   BUN 32* 31*   CREATININE 0.6* 0.7*         Intake/Output Summary (Last 24 hours) at 2021 0511  Last data filed at 2021 0409  Gross per 24 hour   Intake 4075.4 ml   Output 525 ml   Net 3550.4 ml         Ht Readings from Last 1 Encounters:   21 5' 10\" (1.778 m)        Wt Readings from Last 1 Encounters:   21 157 lb 13.6 oz (71.6 kg)         Estimated Creatinine Clearance: 101 mL/min (A) (based on SCr of 0.7 mg/dL (L)). Assessment/Plan:  Vanc trough of 6.7 mg/L after 2 doses of 1000 mg q12h  Will increase vancomycin to 1250 mg IV every 12 hours. Regimen projects a trough level of 15-20 mg/L. Level ordered for 21 0900. Thank you for the consult.    Mi Fritz, LiloD

## 2021-01-05 NOTE — PROGRESS NOTES
Notified by [address] tele monitor found he pulled his steward catheter apart, I was able to deflated the balloon from the piece remaining inside allowing for me to remove all components. The catheter was removed intact and retained for follow up, there was a small amount of blood.   Notified MD

## 2021-01-05 NOTE — CONSULTS
PALLIATIVE MEDICINE CONSULTATION     Patient name:Mark Israel   EBN:[id number]    :1950  Room/Bed:M1K-4907/5124-01   LOS: 2 days         Date of consult:2021    Consult Information  Palliative Medicine Consult performed by: Camille Ken    Requested by: Franco Ortega CNP  Reason for consult: [address]    Number of admissions past 12 months: 0    ASSESSMENT/RECOMMENDATIONS     72 y.o. male with AMS and debility      Symptom Management:  1. AMS- improving slightly unsure what baseline mentation is  2. Debility- found down at home, PT/OT recommending ECF placement due to level of weakness   3. Goals of Care- attempted to talk to pt he does not appear to have capacity to make his own decisions he is asking I call his guardian Judy Navarro    Patient/Family Goals of Care : Attempted to discuss with pt he does requested I talk to his guardian Judy Navarro, I left a message on her VM    Disposition/Discharge Plan:   Pending    Advance Directives:  · Surrogate Decision Maker: Garry Del Cid court appointed legal guardian  · Code status:  Full Code    Case discussed with: patient, floor RN, left message for guardian  Thank you for allowing us to participate in the care of this patient. HISTORY     CC: AMS  HPI: The patient is a 72 y.o. male with Hx of substance induced psychiatric disorder, Merkel Cell Ca in remission as of , anxiety, who presents to [facility] he was found down. Palliative Medicine SymptomScreening/ROS:  Review of Systems - History obtained from chart review and unobtainable from patient due to lack of cooperation  General ROS: positive for  - fatigue and weight loss    Patient unable to complete full ROS due to current cognitive status. Information that is obtained from nursing and chart.      Pain:    Home med list and hospital medications reviewed in chart as of 2021     EXAM     Vitals:    21 1122   BP: 118/73   Pulse: 65   Resp: 20   Temp: 98.2 °F

## 2021-01-06 LAB
ALBUMIN SERPL-MCNC: 2.4 G/DL (ref 3.4–5)
ANION GAP SERPL CALCULATED.3IONS-SCNC: 9 MMOL/L (ref 3–16)
BASOPHILS ABSOLUTE: 0 K/UL (ref 0–0.2)
BASOPHILS RELATIVE PERCENT: 0.7 %
BUN BLDV-MCNC: 19 MG/DL (ref 7–20)
CALCIUM SERPL-MCNC: 7.8 MG/DL (ref 8.3–10.6)
CHLORIDE BLD-SCNC: 105 MMOL/L (ref 99–110)
CO2: 23 MMOL/L (ref 21–32)
CREAT SERPL-MCNC: <0.5 MG/DL (ref 0.8–1.3)
EOSINOPHILS ABSOLUTE: 0.2 K/UL (ref 0–0.6)
EOSINOPHILS RELATIVE PERCENT: 3.3 %
GFR AFRICAN AMERICAN: >60
GFR NON-AFRICAN AMERICAN: >60
GLUCOSE BLD-MCNC: 69 MG/DL (ref 70–99)
GLUCOSE BLD-MCNC: 83 MG/DL (ref 70–99)
GLUCOSE BLD-MCNC: 92 MG/DL (ref 70–99)
HCT VFR BLD CALC: 32.1 % (ref 40.5–52.5)
HEMOGLOBIN: 11 G/DL (ref 13.5–17.5)
LYMPHOCYTES ABSOLUTE: 0.7 K/UL (ref 1–5.1)
LYMPHOCYTES RELATIVE PERCENT: 11.7 %
MCH RBC QN AUTO: 31 PG (ref 26–34)
MCHC RBC AUTO-ENTMCNC: 34.2 G/DL (ref 31–36)
MCV RBC AUTO: 90.6 FL (ref 80–100)
MONOCYTES ABSOLUTE: 0.6 K/UL (ref 0–1.3)
MONOCYTES RELATIVE PERCENT: 10.2 %
NEUTROPHILS ABSOLUTE: 4.4 K/UL (ref 1.7–7.7)
NEUTROPHILS RELATIVE PERCENT: 74.1 %
PDW BLD-RTO: 12.2 % (ref 12.4–15.4)
PERFORMED ON: NORMAL
PERFORMED ON: NORMAL
PHOSPHORUS: 2.5 MG/DL (ref 2.5–4.9)
PLATELET # BLD: 200 K/UL (ref 135–450)
PMV BLD AUTO: 6.8 FL (ref 5–10.5)
POTASSIUM SERPL-SCNC: 3.5 MMOL/L (ref 3.5–5.1)
PROCALCITONIN: 0.08 NG/ML (ref 0–0.15)
RBC # BLD: 3.55 M/UL (ref 4.2–5.9)
SODIUM BLD-SCNC: 137 MMOL/L (ref 136–145)
TOTAL CK: 369 U/L (ref 39–308)
VANCOMYCIN TROUGH: 11.1 UG/ML (ref 10–20)
WBC # BLD: 6 K/UL (ref 4–11)

## 2021-01-06 PROCEDURE — 6370000000 HC RX 637 (ALT 250 FOR IP): Performed by: INTERNAL MEDICINE

## 2021-01-06 PROCEDURE — 97530 THERAPEUTIC ACTIVITIES: CPT

## 2021-01-06 PROCEDURE — 0HBRXZZ EXCISION OF TOE NAIL, EXTERNAL APPROACH: ICD-10-PCS | Performed by: PODIATRIST

## 2021-01-06 PROCEDURE — 82550 ASSAY OF CK (CPK): CPT

## 2021-01-06 PROCEDURE — 99221 1ST HOSP IP/OBS SF/LOW 40: CPT | Performed by: INTERNAL MEDICINE

## 2021-01-06 PROCEDURE — 2580000003 HC RX 258: Performed by: NURSE PRACTITIONER

## 2021-01-06 PROCEDURE — 97530 THERAPEUTIC ACTIVITIES: CPT | Performed by: PHYSICAL THERAPIST

## 2021-01-06 PROCEDURE — 6360000002 HC RX W HCPCS: Performed by: INTERNAL MEDICINE

## 2021-01-06 PROCEDURE — 36415 COLL VENOUS BLD VENIPUNCTURE: CPT

## 2021-01-06 PROCEDURE — 80202 ASSAY OF VANCOMYCIN: CPT

## 2021-01-06 PROCEDURE — 97130 THER IVNTJ EA ADDL 15 MIN: CPT

## 2021-01-06 PROCEDURE — 84145 PROCALCITONIN (PCT): CPT

## 2021-01-06 PROCEDURE — 97129 THER IVNTJ 1ST 15 MIN: CPT

## 2021-01-06 PROCEDURE — 2580000003 HC RX 258: Performed by: INTERNAL MEDICINE

## 2021-01-06 PROCEDURE — 92526 ORAL FUNCTION THERAPY: CPT

## 2021-01-06 PROCEDURE — 85025 COMPLETE CBC W/AUTO DIFF WBC: CPT

## 2021-01-06 PROCEDURE — 99221 1ST HOSP IP/OBS SF/LOW 40: CPT | Performed by: SURGERY

## 2021-01-06 PROCEDURE — 2060000000 HC ICU INTERMEDIATE R&B

## 2021-01-06 PROCEDURE — 80069 RENAL FUNCTION PANEL: CPT

## 2021-01-06 PROCEDURE — 6370000000 HC RX 637 (ALT 250 FOR IP): Performed by: NURSE PRACTITIONER

## 2021-01-06 RX ORDER — DEXTROSE MONOHYDRATE 25 G/50ML
12.5 INJECTION, SOLUTION INTRAVENOUS PRN
Status: DISCONTINUED | OUTPATIENT
Start: 2021-01-06 | End: 2021-01-07 | Stop reason: HOSPADM

## 2021-01-06 RX ORDER — DEXTROSE AND SODIUM CHLORIDE 5; .45 G/100ML; G/100ML
INJECTION, SOLUTION INTRAVENOUS CONTINUOUS
Status: DISCONTINUED | OUTPATIENT
Start: 2021-01-06 | End: 2021-01-07

## 2021-01-06 RX ORDER — NICOTINE POLACRILEX 4 MG
15 LOZENGE BUCCAL PRN
Status: DISCONTINUED | OUTPATIENT
Start: 2021-01-06 | End: 2021-01-07 | Stop reason: HOSPADM

## 2021-01-06 RX ORDER — DEXTROSE MONOHYDRATE 50 MG/ML
100 INJECTION, SOLUTION INTRAVENOUS PRN
Status: DISCONTINUED | OUTPATIENT
Start: 2021-01-06 | End: 2021-01-07 | Stop reason: HOSPADM

## 2021-01-06 RX ADMIN — ENOXAPARIN SODIUM 40 MG: 40 INJECTION SUBCUTANEOUS at 08:50

## 2021-01-06 RX ADMIN — CASTOR OIL AND BALSAM, PERU: 788; 87 OINTMENT TOPICAL at 22:21

## 2021-01-06 RX ADMIN — DEXTROSE AND SODIUM CHLORIDE: 5; 450 INJECTION, SOLUTION INTRAVENOUS at 15:39

## 2021-01-06 RX ADMIN — SODIUM CHLORIDE: 9 INJECTION, SOLUTION INTRAVENOUS at 16:16

## 2021-01-06 RX ADMIN — ASPIRIN 300 MG: 300 SUPPOSITORY RECTAL at 08:50

## 2021-01-06 RX ADMIN — CEFEPIME HYDROCHLORIDE 2 G: 2 INJECTION, POWDER, FOR SOLUTION INTRAVENOUS at 08:50

## 2021-01-06 RX ADMIN — Medication 1250 MG: at 23:02

## 2021-01-06 RX ADMIN — CEFEPIME HYDROCHLORIDE 2 G: 2 INJECTION, POWDER, FOR SOLUTION INTRAVENOUS at 22:21

## 2021-01-06 RX ADMIN — CASTOR OIL AND BALSAM, PERU: 788; 87 OINTMENT TOPICAL at 08:54

## 2021-01-06 RX ADMIN — DEXTROSE AND SODIUM CHLORIDE: 5; 450 INJECTION, SOLUTION INTRAVENOUS at 23:52

## 2021-01-06 RX ADMIN — SODIUM CHLORIDE: 9 INJECTION, SOLUTION INTRAVENOUS at 03:00

## 2021-01-06 RX ADMIN — Medication 1250 MG: at 10:20

## 2021-01-06 ASSESSMENT — PAIN SCALES - GENERAL
PAINLEVEL_OUTOF10: 0

## 2021-01-06 NOTE — PROGRESS NOTES
Physical Therapy  Facility/Department: [facility] 5W PROGRESSIVE CARE  Daily Treatment Note  NAME: Ifeoma Teixeira  : 1950  MRN: [id number]    Date of Service: 2021    Discharge Recommendations:  Continue to assess pending progress, Patient would benefit from continued therapy after discharge, 3-5 sessions per week   PT Equipment Recommendations  Equipment Needed: No  Other: Has cane    Ifeoma Teixeira scored a  on the AM-PAC short mobility form. Current research shows that an AM-PAC score of 17 or less is typically not associated with a discharge to the patient's home setting. Based on the patient's AM-PAC score and their current functional mobility deficits, it is recommended that the patient have 3-5 sessions per week of Physical Therapy at d/c to increase the patient's independence. Please see assessment section for further patient specific details. If patient discharges prior to next session this note will serve as a discharge summary. Please see below for the latest assessment towards goals. Assessment   Body structures, Functions, Activity limitations: Decreased functional mobility ; Decreased safe awareness;Decreased balance;Decreased cognition;Decreased ROM; Decreased endurance;Decreased strength;Decreased posture  Assessment: Per Dr Euceda Ill \"The patient is a 72 y.o. male w Hx of substance induced psychiatric disorder, Merkel Cell Ca in remission as of , anxiety, who presents to [facility] after he was found down. Pt is awake, alert and actively hallucinating with paranoid and ideas of grandiose. He is unable to provide any cohesive Hx albeit he knows he is in the Hospital. Per EMS reports his caregiver found him supine on the floor in his apartment with front door open, covered in urine and feces, and called EMS. It is unclear how long he was down. Work up in ED concerning for acute psychotic episode, early sepsis - likely from R 3rd toe infection, and mild rhabdomyolysis. Pt needs Max A x2 for bed mobility, transfers and is unable to ambulate, he does not initiate physical tasks and is fixated on eating. Pt sat on EOB x 10 min with min A with UE support and mod A w/o support. Patient is limited by pain, endurance, cognition, and limited awareness of abilities. He wants to go home but is unsafe at this time as he lives alone. He is functioning well below his PLOF so recommend skilled PT 3-5 x week post D/C. Will continue to follow. Treatment Diagnosis: Decreased Functional mobility  Prognosis: Fair  PT Education: General Safety; Functional Mobility Training;Orientation  Patient Education: Educated patient on ankle pumps, and hip/ knee flexion. Barriers to Learning: Cognition, behavior  REQUIRES PT FOLLOW UP: Yes  Activity Tolerance  Activity Tolerance: Patient limited by endurance; Patient limited by fatigue;Patient limited by cognitive status  Activity Tolerance: less ability to follow commands as session progressed. Patient Diagnosis(es): The primary encounter diagnosis was Traumatic rhabdomyolysis, initial encounter ([facility] [address] Diagnoses of Dehydration, Urinary tract infection with hematuria, site unspecified, and Delirium were also pertinent to this visit. has a past medical history of Anxiety, Auditory hallucinations, Cancer ([facility] [address] Hyperlipidemia, Hypokalemia, Merkel cell cancer ([facility] [address] Psychoactive substance-induced organic mood disorder ([facility] [address] and Water retention. has no past surgical history on file. Restrictions  Restrictions/Precautions  Restrictions/Precautions: Fall Risk  Position Activity Restriction  Other position/activity restrictions: IV, telemetry; steward, telesitter, binder over steward tubing  Subjective   General  Chart Reviewed:  Yes  Additional Pertinent Hx: Per Dr Devora Garnett \"The patient is a 72 y.o. male w Hx of substance induced psychiatric disorder, Merkel Cell Ca in remission as of 2007, anxiety, who presents to [facility] after he was found down.  Pt is awake, alert and actively hallucinating with paranoid and ideas of grandiose. He is unable to provide any cohesive Hx albeit he knows he is in the Hospital.  Per EMS reports his caregiver found him supine on the floor in his apartment with front door open, covered in urine and feces, and called EMS. It is unclear how long he was down. Work up in ED concerning for acute psychotic episode, early sepsis - likely from R 3rd toe infection, and mild rhabdomyolysis. Response To Previous Treatment: Patient with no complaints from previous session. Family / Caregiver Present: No  Referring Practitioner: Zack Ansari MD  Subjective  Subjective: Patient was in bed wathcing TV upon PT entry. He reluctantly agreed to PT/OT cotx but didn't wasnt to stand because he was fearful of falling. Patient is still focused on eating and require redirection and explanation of why he can't eat. Orientation  Orientation  Overall Orientation Status: Impaired  Orientation Level: Oriented to person;Disoriented to situation;Disoriented to place;Oriented to time(Decreased memory. Patient kept asking for food, his shoes and glasses)  Cognition   Cognition  Overall Cognitive Status: Exceptions  Arousal/Alertness: Delayed responses to stimuli  Following Commands: Follows one step commands with increased time; Follows one step commands with repetition  Attention Span: Attends with cues to redirect  Memory: Decreased recall of precautions;Decreased short term memory  Safety Judgement: Decreased awareness of need for assistance;Decreased awareness of need for safety  Problem Solving: Assistance required to generate solutions;Assistance required to implement solutions;Decreased awareness of errors  Insights: Not aware of deficits  Initiation: Requires cues for all  Sequencing: Requires cues for some  Cognition Comment: Fixated on not being allowed to eat-says he swallows fine  Objective   Bed mobility  Rolling to Left: Maximum assistance(x1)  Rolling to Right: Maximum assistance(x1)  Supine to Sit: Moderate assistance;2 Person assistance  Sit to Supine: Maximum assistance;2 Person assistance  Scooting: Dependent/Total;Maximal assistance;2 Person assistance(at EOB attempts to assist to scoot to side. Dep x2 for repositioning up in bed.)     Ambulation  Ambulation?: No  Stairs/Curb  Stairs?: No     Balance  Posture: Poor  Sitting - Static: Poor  Sitting - Dynamic: Poor  Comments: Minimal assistance(at EOB, x10 min with BUE support onto BS table (more Mod A x1 w/o UE support)      AM-PAC Score  AM-PAC Inpatient Mobility Raw Score : 6 (01/05/21 1050)  AM-PAC Inpatient T-Scale Score : 23.55 (01/05/21 1050)  Mobility Inpatient CMS 0-100% Score: 100 (01/05/21 1050)  Mobility Inpatient CMS G-Code Modifier : CN (01/05/21 1050)          Goals  Short term goals  Time Frame for Short term goals: Within acute stay-ongoing 1/6  Short term goal 1: Bed mobility with min/modA  Short term goal 2: Transfers with min/mod A  Short term goal 3: Amb 48' with RW and min/modA  Patient Goals   Patient goals : I want to go home    Plan    Plan  Times per week: 3-5  Current Treatment Recommendations: Strengthening, Transfer Training, Endurance Training, Cognitive Reorientation, Balance Training, Gait Training, Functional Mobility Training, Safety Education & Training, ROM  Safety Devices  Type of devices:  All fall risk precautions in place, Gait belt, Patient at risk for falls, Bed alarm in place, Telesitter in use, Left in bed, Call light within reach, Nurse notified(Angie MENON)  Restraints  Initially in place: No     Therapy Time   Individual Concurrent Group Co-treatment   Time In 1503         Time Out 1533         Minutes 30         Timed Code Treatment Minutes: [email or web address] [place] [address]

## 2021-01-06 NOTE — PROGRESS NOTES
Physical Therapy  Facility/Department: [facility] PROGRESSIVE CARE  Daily Treatment Note  NAME: Suri Tejada  : 1950  MRN: [id number]    Date of Service: 2021    Discharge Recommendations:  Continue to assess pending progress, Patient would benefit from continued therapy after discharge, 3-5 sessions per week   PT Equipment Recommendations  Equipment Needed: No  Other: Has brane  Suri Tejada scored a  on the AM-PAC short mobility form. Current research shows that an AM-PAC score of 17 or less is typically not associated with a discharge to the patient's home setting. Based on the patient's AM-PAC score and their current functional mobility deficits, it is recommended that the patient have 3-5 sessions per week of Physical Therapy at d/c to increase the patient's independence. Please see assessment section for further patient specific details. If patient discharges prior to next session this note will serve as a discharge summary. Please see below for the latest assessment towards goals. Assessment   Body structures, Functions, Activity limitations: Decreased functional mobility ; Decreased safe awareness;Decreased balance;Decreased cognition;Decreased ROM; Decreased endurance;Decreased strength;Decreased posture  Assessment: Per Dr Stacy Butler \"The patient is a 72 y.o. male w Hx of substance induced psychiatric disorder, Merkel Cell Ca in remission as of , anxiety, who presents to [facility] after he was found down. Pt is awake, alert and actively hallucinating with paranoid and ideas of grandiose. He is unable to provide any cohesive Hx albeit he knows he is in the Hospital. Per EMS reports his caregiver found him supine on the floor in his apartment with front door open, covered in urine and feces, and called EMS. It is unclear how long he was down. Work up in ED concerning for acute psychotic episode, early sepsis - likely from R 3rd toe infection, and mild rhabdomyolysis. Pt needs Max A x2 for bed mobility, transfers and is unable to ambulate, he does not initiate physical tasks and is fixated on eating. Use of stedy was attempted. Patient is limited by pain, endurance, cognition, and limited awareness of abilities. He wants to go home but is unsafe at this time as he lives alone. He is functioning well below his PLOF so recommend skilled PT 3-5 x week post D/C. Will continue to follow. Treatment Diagnosis: Decreased Functional mobility  Prognosis: Fair  Decision Making: High Complexity  History: Per Dr Carine Luz \"The patient is a 72 y.o. male w Hx of substance induced psychiatric disorder, Merkel Cell Ca in remission as of 2007, anxiety, who presents to [facility] after he was found down. Pt is awake, alert and actively hallucinating with paranoid and ideas of grandiose. He is unable to provide any cohesive Hx albeit he knows he is in the Hospital. Per EMS reports his caregiver found him supine on the floor in his apartment with front door open, covered in urine and feces, and called EMS. It is unclear how long he was down. Work up in ED concerning for acute psychotic episode, early sepsis - likely from R 3rd toe infection, and mild rhabdomyolysis  Exam: Pt needs Max A x2 for bed mobility, transfers and is unable to ambulate, he does not initiate physical tasks and is fixated on eating. He wants to go home but is unsafe at this time as he lives alone. He is functioning well below his PLOF so recommend skilled PT 3-5 x week post D/C. Will continue to follow. Clinical Presentation: Unstable  PT Education: PT Role;Plan of Care;Transfer Training;General Safety; Functional Mobility Training;Orientation  Patient Education: Educated patient on ankle pumps, and hip/ knee flexion. Barriers to Learning: Cognition, behavior  REQUIRES PT FOLLOW UP: Yes  Activity Tolerance  Activity Tolerance: Patient limited by endurance; Patient limited by fatigue;Patient limited by cognitive status  Activity Tolerance: less ability to follow commands as session progressed. Patient Diagnosis(es): The primary encounter diagnosis was Traumatic rhabdomyolysis, initial encounter ([facility] [address] Diagnoses of Dehydration, Urinary tract infection with hematuria, site unspecified, and Delirium were also pertinent to this visit. has a past medical history of Anxiety, Auditory hallucinations, Cancer ([facility] [address] Hyperlipidemia, Hypokalemia, Merkel cell cancer ([facility] [address] Psychoactive substance-induced organic mood disorder ([facility] [address] and Water retention. has no past surgical history on file. Restrictions  Restrictions/Precautions  Restrictions/Precautions: Fall Risk  Position Activity Restriction  Other position/activity restrictions: IV, telemetry; steward, telesitter, binder over steward tubing  Subjective   General  Chart Reviewed: Yes  Additional Pertinent Hx: Per Dr Anil Reyes \"The patient is a 72 y.o. male w Hx of substance induced psychiatric disorder, Merkel Cell Ca in remission as of 2007, anxiety, who presents to [facility] after he was found down. Pt is awake, alert and actively hallucinating with paranoid and ideas of grandiose. He is unable to provide any cohesive Hx albeit he knows he is in the Hospital.  Per EMS reports his caregiver found him supine on the floor in his apartment with front door open, covered in urine and feces, and called EMS. It is unclear how long he was down. Work up in ED concerning for acute psychotic episode, early sepsis - likely from R 3rd toe infection, and mild rhabdomyolysis. Response To Previous Treatment: Patient with no complaints from previous session. Family / Caregiver Present: No  Referring Practitioner: Stacie Rao MD  Subjective  Subjective: Patient was in bed wathcing TV upon PT entry. He was agreeable to therapy services but expressed fear of falling when sitting EOB.   Patient is still focused on eating and require redirection and explanation of why he

## 2021-01-06 NOTE — CONSULTS
GASTROENTEROLOGY INPATIENT CONSULTATION      IDENTIFYING DATA/REASON FOR CONSULTATION   PATIENT:  Anitra Wade  MRN:  [id number]  ADMIT DATE: 1/3/2021  TIME OF EVALUATION: 1/6/2021 1:22 PM  HOSPITAL STAY:   LOS: 3 days     REASON FOR CONSULTATION: PEG tube placement    HISTORY OF PRESENT ILLNESS   Anitra Wade is a 72 y.o. male who has a past history notable for Merkel cell lymphoma, hyperlipidemia, schizophrenia who presented [facility] [place] [facility] [place] 1/3/2021 after being found down. Acute psychosis was thought to be 2/2 sepsis 2/2 right lower extremity cellulitis. We have been consulted regarding PEG tube placement following speech evaluation notable for aspiration of all consistencies. Patient was seen by speech therapy at bedside. The patient has denied to speech therapy cough while swallowing or obvious dysphagia. He reports cutting his food into small bites with the ability to \"safely eat\". The patient was started on IV Vanco and cefepime, with podiatry and vascular surgery consultation, as well as infectious disease. The patient's mental status is significantly improved. Patient denies any symptoms of dysphagia, is angry that he has been unable to eat. And he adamantly denies/refuses consideration for PEG placement. PAST MEDICAL, SURGICAL, FAMILY, and SOCIAL HISTORY     Past Medical History:   Diagnosis Date    Anxiety     Auditory hallucinations     Cancer ([address]     Hyperlipidemia     Hypokalemia     Merkel cell cancer ([facility] [address] 2007    Remission in 2007    Psychoactive substance-induced organic mood disorder ([facility] [address]     Water retention      History reviewed. No pertinent surgical history. History reviewed. No pertinent family history.   Social History     Socioeconomic History    Marital status: Single     Spouse name: None    Number of children: None    Years of education: None    Highest education level: None   Occupational History    None   Social Needs    Financial resource strain: None    Food insecurity     Worry: None     Inability: None    Transportation needs     Medical: None     Non-medical: None   Tobacco Use    Smoking status: Former Smoker    Smokeless tobacco: Never Used   Substance and Sexual Activity    Alcohol use: No    Drug use: No    Sexual activity: None   Lifestyle    Physical activity     Days per week: None     Minutes per session: None    Stress: None   Relationships    Social connections     Talks on phone: None     Gets together: None     Attends Congregation service: None     Active member of club or organization: None     Attends meetings of clubs or organizations: None     Relationship status: None    Intimate partner violence     Fear of current or ex partner: None     Emotionally abused: None     Physically abused: None     Forced sexual activity: None   Other Topics Concern    None   Social History Narrative    None       MEDICATIONS   SCHEDULED:      vancomycin, 1,250 mg, Q12H      lactobacillus, 1 capsule, Daily with breakfast      Venelex, , BID      pravastatin, 40 mg, Daily      cefepime, 2 g, Q12H      [Held by provider] QUEtiapine, 50 mg, BID      sodium chloride flush, 10 mL, 2 times per day      enoxaparin, 40 mg, Daily      aspirin, 81 mg, Daily    Or      aspirin, 300 mg, Daily      influenza virus vaccine, 0.5 mL, Prior to discharge      vancomycin (VANCOCIN) intermittent dosing (placeholder), , RX Placeholder      FLUIDS/DRIPS:     sodium chloride 100 mL/hr at 01/06/21 0300     PRNs:     acetaminophen, 650 mg, Q6H PRN      sodium chloride flush, 10 mL, PRN      promethazine, 12.5 mg, Q6H PRN    Or      ondansetron, 4 mg, Q6H PRN      polyethylene glycol, 17 g, Daily PRN      ALLERGIES: No Known Allergies     REVIEW OF SYSTEMS   A full 12 pt ROS is negative other than noted in HPI    PHYSICAL EXAM     Vitals:    01/06/21 0322 01/06/21 0740 01/06/21 1118 01/06/21 1212   BP: 136/68 118/67 130/76 137/76   Pulse: 66 68 62 71   Resp: 16 16 17    Temp: 98 °F (36.7 °C) 98.2 °F (36.8 °C) 98.2 °F (36.8 °C)    TempSrc: Oral Axillary Oral    SpO2: 96% 95% 97%    Weight: 158 lb 11.7 oz (72 kg)      Height:            Physical Exam:  Gen: Resting in bed, NAD   CV: RRR no MRG   Pul: CTAB, normal WOB, no wheezing   Abd: Good bowel sounds throughout, no scars, soft, NT/ND, no masses, no HSM   Ext: No edema, LE wrapped. Atraumatic. Neuro: No gross deficits, moves all 4 extremities and follows commands.    Skin: No jaundice, spider angiomas, amos erythema  Psych: Alert and oriented x4, normal mood, pleasant affect    LABS AND IMAGING     Recent Results (from the past 24 hour(s))   POCT Glucose    Collection Time: 01/05/21  8:20 PM   Result Value Ref Range    POC Glucose 89 70 - 99 mg/dl    Performed on ACCU-CHEK    CBC Auto Differential    Collection Time: 01/06/21  5:04 AM   Result Value Ref Range    WBC 6.0 4.0 - 11.0 K/uL    RBC 3.55 (L) 4.20 - 5.90 M/uL    Hemoglobin 11.0 (L) 13.5 - 17.5 g/dL    Hematocrit 32.1 (L) 40.5 - 52.5 %    MCV 90.6 80.0 - 100.0 fL    MCH 31.0 26.0 - 34.0 pg    MCHC 34.2 31.0 - 36.0 g/dL    RDW 12.2 (L) 12.4 - 15.4 %    Platelets 245 293 - 224 K/uL    MPV 6.8 5.0 - 10.5 fL    Neutrophils % 74.1 %    Lymphocytes % 11.7 %    Monocytes % 10.2 %    Eosinophils % 3.3 %    Basophils % 0.7 %    Neutrophils Absolute 4.4 1.7 - 7.7 K/uL    Lymphocytes Absolute 0.7 (L) 1.0 - 5.1 K/uL    Monocytes Absolute 0.6 0.0 - 1.3 K/uL    Eosinophils Absolute 0.2 0.0 - 0.6 K/uL    Basophils Absolute 0.0 0.0 - 0.2 K/uL   Renal Function Panel    Collection Time: 01/06/21  5:04 AM   Result Value Ref Range    Sodium 137 136 - 145 mmol/L    Potassium 3.5 3.5 - 5.1 mmol/L    Chloride 105 99 - 110 mmol/L    CO2 23 21 - 32 mmol/L    Anion Gap 9 3 - 16    Glucose 69 (L) 70 - 99 mg/dL    BUN 19 7 - 20 mg/dL    CREATININE <0.5 (L) 0.8 - 1.3 mg/dL    GFR Non-African American >60 >60    GFR African American >60 >60    Calcium 7.8 (L) 8.3 - 10.6 mg/dL    Phosphorus 2.5 2.5 - 4.9 mg/dL    Alb 2.4 (L) 3.4 - 5.0 g/dL   CK    Collection Time: 01/06/21  5:04 AM   Result Value Ref Range    Total  (H) 39 - 308 U/L   Procalcitonin    Collection Time: 01/06/21  5:04 AM   Result Value Ref Range    Procalcitonin 0.08 0.00 - 0.15 ng/mL   Vancomycin, Trough    Collection Time: 01/06/21  9:27 AM   Result Value Ref Range    Vancomycin Tr 11.1 10.0 - 20.0 ug/mL     Other Labs      Imaging      ASSESSMENT AND RECOMMENDATIONS   Laura Morrow is a 72 y.o. male who has a past history notable for Merkel cell lymphoma, hyperlipidemia, schizophrenia who presented [facility] [place] [facility] [place] 1/3/2021 after being found down. We have been consulted regarding oropharyngeal dysphagia and consideration for PEG placement    IMPRESSION:    1. Oropharyngeal dysphagia: Patient with abnormal modified barium swallow study, with evidence of oropharyngeal dysphagia with aspiration. Will patient would benefit from PEG placement for safe enteral nutrition, the patient adamantly refuses. The patient currently is alert and oriented x4, and expresses fear of surgery as his rationale to avoid PEG placement. Additionally, he reports behavioral modification which is allowed for safe swallowing, and does not believe he truly has dysphagia. While I can contact the medical POA and have a discussion, given the patient's current mental status, I believe he is making an informed decision. Additionally, placement of PEG in this patient would be high risk, as I would be concerned that he would tamper with the PEG and remove it prior to maturation of the PEG tract. The patient understands the risk of eating, and I would recommend the primary team have a documented discussion with the medical power of  the risks of eating, but liberate the patient's diet in accordance with his wishes. Thank you for allowing me to participate in this patient's care. No further GI work-up needed at this time. We will sign off, however please contact us with any additional questions at [phone]. Donna Melton.  [address]

## 2021-01-06 NOTE — CONSULTS
Infectious Diseases Inpatient Consult Note      Reason for Consult:  WBC elevation and change in mentation and foot infection     Requesting Physician:  JACE Gleason    Primary Care Physician:  Renetta Carmona MD    History Obtained From:  Epic     CHIEF COMPLAINT:     Chief Complaint   Patient presents with    Altered Mental Status     Was found down by fanacial advisor, unsure how long he was on the floor          HISTORY OF PRESENT ILLNESS:  72 y.o. man admitted with change in mental status was found on the floor by his financial counselor and alerted EMS and brought into the hospital.  Patient has a history of psychiatric disorder, ? Autism,  Anxiety and Merkel cell cancer in remission. Patient was unable to provide any useful information on admission. During examination noted to have elevated CK as well as right third toe infection with cellulitis. Admission labs indicate WBC elevation 12.2 with a CK elevation at 1315. Blood culture urine culture from admission thus far negative. He was tested negative for COVID-19. Modified barium swallow indicates significant pooling of the laryngeal penetration concerning for aspiration. MRI of the brain was negative. Chest x-ray was negative. We are consulted for antibiotic recommendations. Past Medical History:    Past Medical History:   Diagnosis Date    Anxiety     Auditory hallucinations     [facility]     Hyperlipidemia     Hypokalemia     Merkel cell cancer ([facility] [facility] 2007    Remission in 2007    Psychoactive substance-induced organic mood disorder ([facility]     Water retention        Past Surgical History:    History reviewed. No pertinent surgical history. Current Medications:    Outpatient Medications Marked as Taking for the 1/3/21 encounter [facility] Encounter)   Medication Sig Dispense Refill    pravastatin (PRAVACHOL) 40 MG tablet Take 40 mg by mouth daily. Allergies:  Patient has no known allergies.     Immunizations : Immunization History   Administered Date(s) Administered    Influenza Vaccine, unspecified formulation 10/31/2016    Influenza Virus Vaccine 12/01/2017         Social History:    Social History     Tobacco Use    Smoking status: Former Smoker    Smokeless tobacco: Never Used   Substance Use Topics    Alcohol use: No    Drug use: No     Social History     Tobacco Use   Smoking Status Former Smoker   Smokeless Tobacco Never Used      Family History : not available       REVIEW OF SYSTEMS:     Not possible       PHYSICAL EXAM:      Vitals:    /76   Pulse 71   Temp 98.2 °F (36.8 °C) (Oral)   Resp 17   Ht 5' 10\" (1.778 m)   Wt 158 lb 11.7 oz (72 kg)   SpO2 97%   BMI 22.78 kg/m²     General Appearance: alert,in some  acute distress, +  pallor, no icterus   Skin: warm and dry, no rash or erythema  Head: normocephalic and atraumatic  Eyes: pupils equal, round, and reactive to light, conjunctivae normal  ENT: tympanic membrane, external ear and ear canal normal bilaterally, nose without deformity, nasal mucosa and turbinates normal without polyps  Neck: supple and non-tender without mass, no thyromegaly  no cervical lymphadenopathy  Pulmonary/Chest: clear to auscultation bilaterally- no wheezes, rales or rhonchi, normal air movement, no respiratory distress  Cardiovascular: normal rate, regular rhythm, normal S1 and S2, no murmurs, rubs, clicks, or gallops, no carotid bruits  Abdomen: soft, non-tender, non-distended, normal bowel sounds, no masses or organomegaly  Extremities: no cyanosis, clubbing or edema  Musculoskeletal: normal range of motion, no joint swelling, deformity or tenderness  Integumentary: No rashes, no abnormal skin lesions, no petechiae  Neurologic: reflexes normal and symmetric, no cranial nerve deficit  Lines:IV  Rt foot 3rd toe with local skin tear, edema and cellulitis     DATA:    CBC:   Lab Results   Component Value Date    WBC 6.0 01/06/2021    HGB 11.0 (L) 01/06/2021    HCT old please draw pediatric bottle. ~Blood Culture #1   Performed at:   [facility]   [address] [place] [address]   Phone [phone]   Culture, Blood 2 [[id number]] Collected: 01/04/21 1805   Order Status: Completed Specimen: Blood Updated: 01/05/21 1915    Culture, Blood 2 No Growth to date.  Any change in status will be called. Narrative:     ORDER#: [id number]                          ORDERED BY: Shira Raphael   SOURCE: Blood                              COLLECTED:  01/04/21 18:05   ANTIBIOTICS AT DRE. :                      RECEIVED :  01/04/21 18:12   If child <=2 yrs old please draw pediatric bottle. ~Blood Culture #2   Performed at:   [facility]   [address] [facility] [address] [place] [address] [address]   Phone [phone]   Culture, Urine [[id number]] Collected: 01/03/21 1254   Order Status: Completed Specimen: Urine, clean catch Updated: 01/04/21 1114    Urine Culture, Routine No growth at 18 to 36 hours   Narrative:     ORDER#: [id number]                          ORDERED BY: Jose Hawk   SOURCE: Urine Clean Catch                  COLLECTED:  01/03/21 12:54   ANTIBIOTICS AT DRE. :                      RECEIVED :  01/03/21 13:36   Performed at:   [facility]   [address] [place] [address]   Phone [phone]         Blood Culture:   Lab Results   Component Value Date    [facility]  01/04/2021     No Growth to date. Any change in status will be called. BLOODCULT2  01/04/2021     No Growth to date. Any change in status will be called.      1/3/2021  1:38 PM - Berle Senath Incoming Lab Results From Soft (Epic Adt)         Component Value Ref Range & Units Status Collected Lab   SARS-CoV-2, NAAT Not Detected  Not Detected Final 01/03/2021 12:47 PM [facility]   Rapid NAAT:   Negative results should be treated as presumptive and,   if inconsistent with clinical signs and symptoms or necessary for   patient management, should be tested with an alternative molecular   assay. Negative results do not preclude SARS-CoV-2 infection and   should not be used as the sole basis for patient management decisions. This test has been authorized by the FDA under an Emergency Use       Viral Culture:    Lab Results   Component Value Date    COVID19 Not Detected 01/03/2021     Urine Culture: No results for input(s): [facility] in the last 72 hours. Scheduled Meds:   vancomycin  1,250 mg Intravenous Q12H    lactobacillus  1 capsule Oral Daily with breakfast    Venelex   Topical BID    pravastatin  40 mg Oral Daily    cefepime  2 g Intravenous Q12H    [Held by provider] QUEtiapine  50 mg Oral BID    sodium chloride flush  10 mL Intravenous 2 times per day    enoxaparin  40 mg Subcutaneous Daily    aspirin  81 mg Oral Daily    Or    aspirin  300 mg Rectal Daily    influenza virus vaccine  0.5 mL Intramuscular Prior to discharge    vancomycin (VANCOCIN) intermittent dosing (placeholder)   Other RX Placeholder       Continuous Infusions:   dextrose      dextrose 5 % and 0.45 % NaCl      sodium chloride 100 mL/hr at 01/06/21 0300       PRN Meds:  glucose, dextrose, glucagon (rDNA), dextrose, acetaminophen, sodium chloride flush, promethazine **OR** ondansetron, polyethylene glycol    Imaging:   FL MODIFIED BARIUM SWALLOW W VIDEO   Final Result   Significant pooling of administered consistencies with subsequent laryngeal   penetration. Please see separate speech pathology report for full discussion of findings   and recommendations. VL Extremity Venous Bilateral   Final Result      MRI BRAIN WO CONTRAST   Final Result   No acute intracranial abnormality. Mild parenchymal volume loss. Mild chronic microvascular disease. XR FOOT RIGHT (MIN 3 VIEWS)   Final Result   Degenerative changes and mild diffuse soft tissue edema. No acute fracture.          CT HEAD WO CONTRAST   Final Result   No acute intracranial abnormality. No acute fracture of the cervical or lumbar spine. CT CERVICAL SPINE WO CONTRAST   Final Result   No acute intracranial abnormality. No acute fracture of the cervical or lumbar spine. CT LUMBAR SPINE WO CONTRAST   Final Result   No acute intracranial abnormality. No acute fracture of the cervical or lumbar spine. XR CHEST PORTABLE   Final Result   No acute findings         XR PELVIS (1-2 VIEWS)   Final Result   Ossification inferomedial to the left hip joint near the left lesser   trochanter. Findings may be from previous injury or represent soft tissue   calcification. Recommend dedicated radiographs of the left hip for better   characterisation. All pertinent images and reports for the current Hospitalization were reviewed by me. IMPRESSION:    Patient Active Problem List   Diagnosis    Syncope    Localized edema    Mixed hyperlipidemia    Bilateral lower extremity edema    Venous stasis dermatitis of both lower extremities    Acute delirium    Depression with anxiety    Cellulitis of toe of left foot    Rhabdomyolysis     Change in mentation   Found down  H/o Psychiatric illness  CPK elevation   Rt 3rd toe cellulitis  MRI brain -ve  Poor swallowing and aspiration concern by MBS study  CXR negative on admit    Rt foot seems to be improving on IV abx therapy and no fevers and WBC normal and we can choose oral abx therapy if he can swallow safely if not do not anticipate more x than 48 hrs of abx therapy at this time     Labs, Microbiology, Radiology and pertinent results from current hospitalization and care every where were reviewed by me as a part of the consultation. PLAN :  1. D/C Cefepime  2. D/C IV Vancomycin   3. IV Ceftriaxone x 2 gm once a  Day for x 2 days   4. If he can swallow can use oral Augmentin x 875 mg X q 12 hr x 3 days at d/c   5.  Will sign off    Discussed with patient/Family and Nursing     Thanks for allowing me to participate in your patient's care please call me with any questions or concerns.     Dr. Vikas Prince MD  [address] [place] [address] Physician  Phone: [phone]   Fax : [phone]

## 2021-01-06 NOTE — PROGRESS NOTES
[facility]   Speech Therapy  Daily Dysphagia Treatment Note    Marta Krause  AGE: 72 y.o. GENDER: male  : 1950  [id number]  EPISODE DATE:  1/3/2021     Patient Active Problem List   Diagnosis    Syncope    Localized edema    Mixed hyperlipidemia    Bilateral lower extremity edema    Venous stasis dermatitis of both lower extremities    Acute delirium    Depression with anxiety    Cellulitis of toe of left foot    Rhabdomyolysis     No Known Allergies     Treatment Diagnosis: Dysphagia     CHART REVIEW:  1/3/2021 admitted s/p being found down: ADMISSION H&P HPI: The patient is a 80 y. o. male w Hx of substance induced psychiatric disorder, Merkel Cell Ca in remission as of , anxiety, who presents to [facility] he was found down. Pt is awake, alert and actively hallucinating with paranoid and ideas of grandiose. He is unable to provide any cohesive Hx albeit he knows he is in the Hospital. Per EMS reports his caregiver found him supine on the floor in his apartment with front door open, covered in urine and feces, and called EMS. It is unclear how long he was down. Work up in ED concerning for acute psychotic episode, early sepsis - likely from R 3rd toe infection, and mild rhabdomyolysis.       1/3/2021 CXR: No acute findings     1/3/2021 CT CERVICAL SPINE:   Impression   No acute intracranial abnormality.       No acute fracture of the cervical or lumbar spine.      2021 BRAIN MRI:   Impression   No acute intracranial abnormality.       Mild parenchymal volume loss.       Mild chronic microvascular disease.      2021 MBS:  Moderate to Marked Oropharyngeal Dysphagia characterized by anterior munching /mashing mastication pattern; poor lingual coordination for bolus control ; delayed palatal trigger; delayed initiation of swallow; reduced hyolaryngeal excursion; reduced tongue base retraction and reduced pharyngeal clearance.  Pt with poor sensation of pharyngeal residue or frequent penetration without clearance which pooled to level of VC. Symptoms:   · Premature loss to pharynx across consistencies  · Pooling to the valleculae prior to the swallow  · Episodes of deep penetration of thin liquids before/during the swallow without clearance and without pt sensation  · Episodes of penetration of pharyngeal residue post swallow without pt sensation. · Severity of pharyngeal residue increases as viscosity of item increases. Pharyngeal residue in the valleculae > pharyngeal wall and pyriform sinus  Compensatory strategies:   · No improvement with chin tuck ; head rotation right or head rotation left. Pt required max verbal/physical cues for compensatory head postures.   · volitional cough: strong but did not completely clear penetration  · Repeat clearance swallows persistent penetration of pharyngeal residue   Recommend npo and referral back to MD for nutrition mode as this SLP is unable to r/o aspiration if made po.    1/6/2021 chart review indicates possible plan for hospice      Subjective:     Current diet  NPO - awaiting confirmation of goals of care (PEG vs Hospice)    Comments regarding tolerating Current Diet:   Patient continuously requesting PO from staff      Objective:     Pain   Patient Currently in Pain: Denies    Cognitive/Behavior   Behavior/Cognition: Alert, Cooperative, Pleasant mood, Confused, Distractible    Presentations   Consistencies Administered: Nectar - tsp    Positioning   Fully upright in bed    PO Trials:  · Thin Liquids  · Nectar thick liquids: repeat swallows (reinforced to patient repeat swallow to clear tsp of liquid is atypical and not normal for a 70yo M as patient repeatedly insists no problem swallowing for a 71yo); increasing wet vocal quality over 4 trials  · Honey Thick liquids  · Puree   · Soft food  · Regular food    Dysphagia Tx:   · PO trials (administered to demonstrate concern for decreased PO safety):  Nectar via tsp x4 with: repeat swallows (reinforced to patient repeat swallow to clear tsp of liquid is atypical and not normal for a 72yo M as patient repeatedly insists no problem swallowing for a 71yo); increasing wet vocal quality over 4 trials  · Patient education: reviewed dysphagia course: recalls swallow eval and MBS; reviewed MBS recs/rationale for recs: patient denies reported findings insistent there is no problem swallowing despite examples of symptoms repeated for patient as well as symptoms of dysphagia highlighted during PO trials; introduced concept for risks associated with PO: patient with no evidence of earning and with limited acceptance of teaching. Despite max attempts for education, patient repeatedly requesting meal tray  · Swallow ex: despite decreased insight to impairments, patient agreeable to swallow ex which were completed 10x each: lingual raise, yawn, falsetto, ha    Goals:   Dysphagia Goals: The patient will tolerate instrumental swallowing procedure met 1/5/2021  New goal: Patient will trials dysphagia tx pending goals of care with focus on swallow ex completion 10/10 and education on dysphagia initiated    Assessment:   Impressions:   Dysphagia Diagnosis: Suspected needs further assessment   Accepted and tolerated tx at bedside. Patient alert, cooperative, pleasant, but with limited insight to dysphagia. Follows simple dx. Verbally responsive but noted with decreased reasoning. Persistent concern for severe oropharyngeal dysphagia with severe risk for penetration/aspiration. Suspect comparable to above noted MBS completed 1/5/2021. Initiated trial tx for education and strengthening; tolerated well, but suspect limited tx benefit r/t decreased insight. Will continue to trial tx pending confirmation of goals of care.     Diet Recommendations:  Solid consistency: NPO   Liquid consistency: NPO   Medication administration: Via NG/PEG   Compensatory Swallowing Strategies: (oral

## 2021-01-06 NOTE — PROGRESS NOTES
Hospital Medicine Progress Note      Admit Date: 1/3/2021       CC: F/U for found down on floor - encephalopathy from cellulitis    HPI: awake and alert today but confused. States he is planning on going back home to live independently, but scored a 6/24 on his AM-PAC. He has a legal guardian POA. He did not do well with his barium swallow eval so need to engage Palliative Care with discussion with the POA on nutrition/feeding tubes, etc., at this point. Will need SNF placement on d/c.      He is likely experiencing delirium from the toe infection he has on his right foot. pics in the chart. Podiatry is being consulted. And he is currently receiving IV antibiotics for this. He was admitted with rhabdo from dehydration and being down on admission. CK is improving with IVF. Will defer Psych consult for now in the setting of his alertness at least for now and obvious infection. If his status changes mentally, we can re-engage them. I feel his current worsening confusion is likely d/t acute infection of his toe complicated by dehydration. He is already showing improvement with meds here.     Awaiting recs from Podiatry. Cont current antibx in the meantime.  -------------  POA wishes to seek Palliative care consult and possibly hospice. Consult placed. Requiring wrist restraints for spontaneous behavior and pulling at lines,etc. For safety issues. Interval History/Subjective: Podiatry consult was called twice. They still have not seen the patient. Called the nurse to check on this. Consulted Infectious Disease. Patient is already on cefepime and vancomycin. Leukocytosis improved    Consulted vascular surgery - Dr. Andrae Watts, who he has seen in the past, for bilat venous insufficiency and toe cellulitis    POA seeking Palliative care consult and possibly hospice for patient. Need podiatry recs. Cont antibx for toe cellulitis CK improved to 369.     Modified barium swallow:  Significant pooling of administered consistencies with subsequent laryngeal   penetration.       Please see separate speech pathology report for full discussion of findings   and recommendations. Consulted GI for G-TUBE placement eval. Vs palliative care. He may not be a candidate for GT given his confusion and mental status. 12:17pm Called Laith gonzalez, Legal guardian, per chart. She states he has home care in the home but he refuses every time. She says it has been a month she thinks since he showered. Obviously there are concerns for his safety at home with his mental status. Review of Systems:       The patient denied headaches, visual changes, LOC, SOB, CP, ABD pain, N/V/D, skin changes, new or worsening weakness or neuromuscular deficits. Comprehensive ROS negative except as mentioned above. Past Medical History:        Diagnosis Date    Anxiety     Auditory hallucinations     [facility] ([facility] [address]     Hyperlipidemia     Hypokalemia     Merkel cell cancer ([facility] [address] 2007    Remission in 2007    Psychoactive substance-induced organic mood disorder ([facility] [address]     Water retention        Past Surgical History:    History reviewed. No pertinent surgical history. Allergies:  Patient has no known allergies. Past medical and surgical history reviewed. Any changes have been noted. PHYSICAL EXAM:  /67   Pulse 68   Temp 98.2 °F (36.8 °C) (Axillary)   Resp 16   Ht 5' 10\" (1.778 m)   Wt 158 lb 11.7 oz (72 kg)   SpO2 95%   BMI 22.78 kg/m²       Intake/Output Summary (Last 24 hours) at 1/6/2021 0758  Last data filed at 1/6/2021 0330  Gross per 24 hour   Intake 2019.6 ml   Output 900 ml   Net 1119.6 ml      General appearance: disheveled and unkempt, No apparent distress appears stated age.   HEENT Normal cephalic, atraumatic without obvious deformity.  Pupils equal, round, and reactive to light, pinpoint. EOMi Conjunctivae/corneas clear.   Neck: Supple, No jugular venous distention/bruits.  Trachea midline without thyromegaly or adenopathy with full range of motion. Lungs: Clear to auscultation, bilaterally without Rales/Wheezes/Rhonchi with good respiratory effort. Heart: Regular rate and rhythm with Normal S1/S2 without murmurs, rubs or gallops, point of maximum impulse non-displaced  Abdomen: Soft, non-tender or non-distended without rigidity or guarding and positive bowel sounds all four quadrants. Extremities:   Extreme case of onychomycosis and also R 3rd toe with changes of cellulitis spreading to foot.   Neurologic:follows commands, confused speech at times with answering questions, alert, chatty, definitely brighter today  Capillary Refill: Acceptable  < 3 seconds  Peripheral Pulses: +3 Easily felt, not easily obliterated with pressure                     LABS:    Lab Results   Component Value Date    WBC 6.0 01/06/2021    HGB 11.0 (L) 01/06/2021    HCT 32.1 (L) 01/06/2021    MCV 90.6 01/06/2021     01/06/2021    LYMPHOPCT 11.7 01/06/2021    RBC 3.55 (L) 01/06/2021    MCH 31.0 01/06/2021    MCHC 34.2 01/06/2021    RDW 12.2 (L) 01/06/2021       Lab Results   Component Value Date    CREATININE <0.5 (L) 01/06/2021    BUN 19 01/06/2021     01/06/2021    K 3.5 01/06/2021     01/06/2021    CO2 23 01/06/2021       Lab Results   Component Value Date    MG 2.40 05/21/2017       Lab Results   Component Value Date    ALT 42 (H) 01/03/2021     (H) 01/03/2021    ALKPHOS 75 01/03/2021    BILITOT 0.8 01/03/2021        No flowsheet data found. Lab Results   Component Value Date    LABA1C 5.6 01/04/2021       Imaging:  FL MODIFIED BARIUM SWALLOW W VIDEO   Final Result   Significant pooling of administered consistencies with subsequent laryngeal   penetration. Please see separate speech pathology report for full discussion of findings   and recommendations. VL Extremity Venous Bilateral   Final Result      MRI BRAIN WO CONTRAST   Final Result   No acute intracranial abnormality.       Mild parenchymal volume loss. Mild chronic microvascular disease. XR FOOT RIGHT (MIN 3 VIEWS)   Final Result   Degenerative changes and mild diffuse soft tissue edema. No acute fracture. CT HEAD WO CONTRAST   Final Result   No acute intracranial abnormality. No acute fracture of the cervical or lumbar spine. CT CERVICAL SPINE WO CONTRAST   Final Result   No acute intracranial abnormality. No acute fracture of the cervical or lumbar spine. CT LUMBAR SPINE WO CONTRAST   Final Result   No acute intracranial abnormality. No acute fracture of the cervical or lumbar spine. XR CHEST PORTABLE   Final Result   No acute findings         XR PELVIS (1-2 VIEWS)   Final Result   Ossification inferomedial to the left hip joint near the left lesser   trochanter. Findings may be from previous injury or represent soft tissue   calcification. Recommend dedicated radiographs of the left hip for better   characterisation.              Scheduled and prn Medications:    Scheduled Meds:   vancomycin  1,250 mg Intravenous Q12H    lactobacillus  1 capsule Oral Daily with breakfast    Venelex   Topical BID    pravastatin  40 mg Oral Daily    cefepime  2 g Intravenous Q12H    [Held by provider] QUEtiapine  50 mg Oral BID    sodium chloride flush  10 mL Intravenous 2 times per day    enoxaparin  40 mg Subcutaneous Daily    aspirin  81 mg Oral Daily    Or    aspirin  300 mg Rectal Daily    influenza virus vaccine  0.5 mL Intramuscular Prior to discharge    vancomycin (VANCOCIN) intermittent dosing (placeholder)   Other RX Placeholder     Continuous Infusions:   sodium chloride 100 mL/hr at 01/06/21 0300     PRN Meds:.acetaminophen, sodium chloride flush, promethazine **OR** ondansetron, polyethylene glycol    Assessment & Plan:            Metabolic encephalopathy - clearing and much improved   - confusion/delirium - baseline autism with hx medical non-compliance  - likely due to infection  - MRI brain neg for acute findings  - Neuro consult  - EEG ?     Early Sepsis secondary to R 3rd toe cellulitis. Severe onychomycosis due to lack of self hygiene. Plan:    - IV vanc and cefepime. - culturelle  - podiatry consult.    - consider vascular studies pending response to above. - I did order venous dopplers due to b/l LE edema. - called Podiatry again for recs/greatly appreciated. - consulted Dr. Emanuel Gutiérrez - vascular surgery (has seen pt in past)  - Consulted Infectious Disease - appreciate recs      chronic venous insufficiency  - bilat LEs  - current toe cellulitis and wound  - has excellent arterial circulation  - Consulted Vascular Surgery - saw Dr. Emanuel Gutiérrez in the past ; recs to follow    Rhabdomyolysis - due to prolonged immobility. IVF and trend CK daily for now  - continues to improved to  369 -->504 from 1,315 on adm  - much improved CK        Fall and found down at home.   Etiology not clear. Likely 2/2 encephalopathy due to cellulits and acute infection  CT head non focal.   Initiated CVA order set. MRI brain no acute findings  Monitor in seizure precautions. Consider EEG and neurology consult Monday.   trops serial neg so far        Hx of Psych Disorder substance induced. Hx autism  - has legal guardian and POA  - hx anxiety/depression  Pt denies taking anything but his prescribed meds. Urine drug screen neg  Seroquel for symptom control. I suspect he is going through acute psychotic episode. Consulted Psych, however defer at this time due to clearing of mentation and likely 2/2 delirium d/t toe infection  - can re-engage if needed    Dysphagia  - consult GI for poss GT placement  - palliative care as alternative    Continue current regimen/therapies. Monitor. Adjust medical regimen as appropriate. Body mass index is 22.78 kg/m².     The patient and / or the family were informed of the results of any tests, a time was given to answer questions, a plan was proposed and they agreed with plan.       DVT ppx: lovenox      Diet: Diet NPO Effective Now    Consults:  IP CONSULT TO HOSPITALIST  PHARMACY TO DOSE VANCOMYCIN  IP CONSULT TO PODIATRY  IP CONSULT TO SOCIAL WORK  IP CONSULT TO PSYCHIATRY  IP CONSULT TO DIETITIAN  IP CONSULT TO PALLIATIVE CARE  IP CONSULT TO PALLIATIVE CARE  IP CONSULT TO INFECTIOUS DISEASES    DISPO/placement plan: pending  Code Status: Full Code      Lilian Oakes, JACE - VANIA  01/06/21

## 2021-01-06 NOTE — PROGRESS NOTES
Occupational Therapy  Facility/Department: [facility] 5V PROGRESSIVE CARE  Daily Treatment Note  NAME: Laura Morrow  : 1950  MRN: [id number]    Date of Service: 2021    Discharge Recommendations:  3-5 sessions per week, Patient would benefit from continued therapy after discharge     Laura Morrow scored a  on the AM-PAC ADL Inpatient form. Current research shows that an AM-PAC score of 17 or less is typically not associated with a discharge to the patient's home setting. Based on the patient's AM-PAC score and their current ADL deficits, it is recommended that the patient have 3-5 sessions per week of Occupational Therapy at d/c to increase the patient's independence. Please see assessment section for further patient specific details. If patient discharges prior to next session this note will serve as a discharge summary. Please see below for the latest assessment towards goals. Assessment   Performance deficits / Impairments: Decreased functional mobility ; Decreased safe awareness;Decreased balance;Decreased ADL status; Decreased cognition;Decreased endurance;Decreased high-level IADLs;Decreased strength  Assessment: Discussed with OTR:Pt is limited by the above deficits, todlay, requring Mod x2 to Max-dep for bed mob, including scooting at EOB and up in bed. Pt sat at EOB x10 min with MIn A x1, with UE's supported on BS table. Pt focused throughout session on wanting to eat, not aware of swallowing deficits and risk for aspiration. Pt not safe to return home. Recommend low-mod frequency therapy at d/c. Cont poc. Prognosis: Fair  History: Pt presents after being found down by POA and covered in stool/urine. Pt with a history of Autism but was living alone with MOWs and HHA every other week to assist with laundry.   OT Education: OT Role;Plan of Care;Orientation  Barriers to Learning: autism, delusions  REQUIRES OT FOLLOW UP: Yes  Activity Tolerance  Activity Tolerance: Treatment limited secondary to decreased cognition;Patient limited by fatigue  Safety Devices  Safety Devices in place: Yes  Type of devices: Bed alarm in place; Left in bed;Call light within reach;Nurse notified         Patient Diagnosis(es): The primary encounter diagnosis was Traumatic rhabdomyolysis, initial encounter ([facility] [address] Diagnoses of Dehydration, Urinary tract infection with hematuria, site unspecified, and Delirium were also pertinent to this visit. has a past medical history of Anxiety, Auditory hallucinations, Cancer ([facility] [address] Hyperlipidemia, Hypokalemia, Merkel cell cancer ([facility] [address] Psychoactive substance-induced organic mood disorder ([facility] [address] and Water retention. has no past surgical history on file. Restrictions  Restrictions/Precautions  Restrictions/Precautions: Fall Risk  Position Activity Restriction  Other position/activity restrictions: IV, telemetry; steward, telesitter, binder over steward tubing  Subjective   General  Chart Reviewed: Yes, Orders, Progress Notes  Patient assessed for rehabilitation services?: Yes  Additional Pertinent Hx: Per Dr. Stacy Butler, \"The patient is a 72 y.o. male w Hx of substance induced psychiatric disorder, Merkel Cell Ca in remission as of 2007, anxiety, who presents to [facility] after he was found down. Pt is awake, alert and actively hallucinating with paranoid and ideas of grandiose. He is unable to provide any cohesive Hx albeit he knows he is in the Hospital.  Per EMS reports his caregiver found him supine on the floor in his apartment with front door open, covered in urine and feces, and called EMS. It is unclear how long he was down. Work up in ED concerning for acute psychotic episode, early sepsis - likely from R 3rd toe infection, and mild rhabdomyolysis. \"  Family / Caregiver Present: No  Referring Practitioner: Dr. Stacy Butler  Diagnosis: Rhabdomyosis  Subjective  Subjective: Pt seen BS, in bed. Pt agreeable to OT/PT co-tx(2nd session for PT).  Pt requently (01/06/21 1534)    Goals  Short term goals  Time Frame for Short term goals: 1 week. status: goals ongoing  Short term goal 1: Pt will be min A with functional transfers  Short term goal 2: Pt will be min A with functional mobility  Short term goal 3: Pt will be min A with bathing and dressing  Short term goal 4: Pt will be min A with bed mobility  Short term goal 5: Pt will be min A with toileting  Long term goals  Time Frame for Long term goals : TBD at next level of care  Patient Goals   Patient goals : Pt repeatedly asking to go home during session.        Therapy Time   Individual Concurrent Group Co-treatment   Time In 1503         Time Out 1533         Minutes [address] [employer] JJ/L,[id number]

## 2021-01-06 NOTE — PROGRESS NOTES
PALLIATIVE MEDICINE PROGRESS NOTE     Patient name:Mark Vinson    M :1950  Room/Bed:O4P-4736/5124-01    LOS: 3 days        ASSESSMENT/RECOMMENDATIONS     72 y.o. male with AMS and debility        Symptom Management:  1. AMS- improving slightly unsure what baseline mentation is  2. Debility- found down at home, PT/OT recommending ECF placement due to level of weakness   3. Goals of Care- attempted to talk to pt he does not appear to have capacity to make his own decisions he is asking I call his guardian Michelle Ceron     Patient/Family Goals of Care : Attempted to discuss with pt he does requested I talk to his guardian Michelle Ceron, I left a message on her VM haven't heard anything back left a message again     Disposition/Discharge Plan:   Pending     Advance Directives:  · Surrogate Decision Maker: Sathish Jon court appointed legal guardian  · Code status:  Full Code     Case discussed with: patient, floor RN, left message for guardian  Thank you for allowing us to participate in the care of this patient. SUBJECTIVE     Chief Complaint: Debility    Last 24 hours:   Pt continues to improve in mentation today. ROS:  Review of Systems - History obtained from chart review and unobtainable from patient due to lack of cooperation  General ROS: positive for  - fatigue and weight loss     Patient unable to complete full ROS due to current cognitive status. Information that is obtained from nursing and chart. OBJECTIVE   /76   Pulse 71   Temp 98.2 °F (36.8 °C) (Oral)   Resp 17   Ht 5' 10\" (1.778 m)   Wt 158 lb 11.7 oz (72 kg)   SpO2 97%   BMI 22.78 kg/m²   I/O last 3 completed shifts:   In: 2019.6 [I.V.:1719.6; IV Piggyback:300]  Out: 900 [Urine:900]  I/O this shift:  In: -   Out: 600 [Urine:600]      Physical Examination:   General appearance - oriented to person, place, and time and chronically ill appearing  Mental status - uncooperative  Neck - supple, no significant adenopathy  Chest - clear to auscultation, no wheezes, rales or rhonchi, symmetric air entry  Abdomen - soft, nontender, nondistended, no masses or organomegaly  Neurological - motor and sensory grossly normal bilaterally  Skin - normal coloration and turgor, no rashes, no suspicious skin lesions noted       Signed By: Electronically signed by JACE Morales CNP on 1/6/2021 at 12:28 PM   Palliative Medicine   [phone]    January 6, 2021

## 2021-01-06 NOTE — CONSULTS
Auditory hallucinations, Cancer ([facility] [address] Hyperlipidemia, Hypokalemia, Merkel cell cancer ([facility] [address] Psychoactive substance-induced organic mood disorder ([facility] [address] and Water retention. Past Surgical History  Reviewed has no past surgical history on file. Medications  Prior to Admission medications    Medication Sig Start Date End Date Taking? Authorizing Provider   pravastatin (PRAVACHOL) 40 MG tablet Take 40 mg by mouth daily. Yes Historical Provider, MD    Scheduled Meds:   vancomycin  1,250 mg Intravenous Q12H    lactobacillus  1 capsule Oral Daily with breakfast    Venelex   Topical BID    pravastatin  40 mg Oral Daily    cefepime  2 g Intravenous Q12H    [Held by provider] QUEtiapine  50 mg Oral BID    sodium chloride flush  10 mL Intravenous 2 times per day    enoxaparin  40 mg Subcutaneous Daily    aspirin  81 mg Oral Daily    Or    aspirin  300 mg Rectal Daily    influenza virus vaccine  0.5 mL Intramuscular Prior to discharge    vancomycin (VANCOCIN) intermittent dosing (placeholder)   Other RX Placeholder     Continuous Infusions:   sodium chloride 100 mL/hr at 01/06/21 0300     PRN Meds:.acetaminophen, sodium chloride flush, promethazine **OR** ondansetron, polyethylene glycol  Allergies  has No Known Allergies. Family History  Reviewedfamily history is not on file. Social History   reports that he has quit smoking. He has never used smokeless tobacco. He reports that he does not drink alcohol or use drugs. EDUCATION  Patient educated about their illness/diagnosis, stated above, and all questions answered. We discussed the importance of nutrition, medications they are taking, and healthy lifestyle. Review of Systems:  Unable to give a good review of systems  OBJECTIVE:   VITALS:  height is 5' 10\" (1.778 m) and weight is 158 lb 11.7 oz (72 kg). His oral temperature is 98.2 °F (36.8 °C). His blood pressure is 130/76 and his pulse is 62. His respiration is 17 and oxygen saturation is 97%. CONSTITUTIONAL: awake, alert, cooperative, no apparent distress, and appears stated age and not clearly oriented  SKIN: Skin color, texture, turgor normal. No rashes or lesions. LYMPH: no cervical nodes, no inguinal nodes  HEENT: Head is normocephalic, atraumatic. EOMI, PERRLA. NECK: Supple, symmetrical, trachea midline, no adenopathy, thyroid symmetric, not enlarged and no tenderness, skin normal.  CHEST/LUNGS: chest symmetric with normal A/P diameter, normal respiratory rate and rhythm, lungs clear to auscultation without wheezes, rales or rhonchi. No accessory muscle use. Scars None   CARDIOVASCULAR: Heart sounds are normal.  Regular rate and rhythm without murmur, gallop or rub. Normal S1 and S2. . Carotid and femoral pulses 2+/4 and equal bilaterally. ABDOMEN: Normal shape. No and Laparoscopic scar(s) present. Normal bowel sounds. No bruits. . soft, nontender, nondistended, no masses or organomegaly. no evidence of hernia. Percussion: Normal without hepatosplenomegally. Tenderness: absent. RECTAL: deferred, not clinically indicated  NEUROLOGIC: Mental Status Exam:  Level of Alertness:   awake. EXTREMITIES: no cyanosis, no clubbing and no edema.   LABS:     Recent Labs     01/03/21  1212 01/03/21  1254 01/04/21  0436 01/05/21  0529 01/06/21  0504   WBC 17.2*  --  12.2* 6.1 6.0   HGB 13.8  --  11.8* 10.5* 11.0*   HCT 41.0  --  35.4* 30.7* 32.1*     --  249 191 200     --  141 140 137   K 4.2  --  4.1 3.8 3.5     --  108 106 105   CO2 26  --  23 25 23   BUN 32*  --  31* 27* 19   CREATININE 0.6*  --  0.7* <0.5* <0.5*   PHOS  --   --  2.8 2.6 2.5   CALCIUM 9.3  --  8.7 8.0* 7.8*   *  --   --   --   --    ALT 42*  --   --   --   --    BILITOT 0.8  --   --   --   --    NITRU  --  Negative  --   --   --    COLORU  --  Yellow  --   --   --      Recent Labs     01/03/21  1212 01/03/21  1212 01/06/21  0504   ALKPHOS 75  --   --    ALT 42*  --   --    *  --   --    BILITOT 0.8  --

## 2021-01-06 NOTE — PLAN OF CARE
Pt is a high fall risk. Bed in locked, low position with side rails up X 3 and an active bed alarm. Fall risk socks and sign in place. RN placed a fall risk bracelet. Pt instructed to not get OOB by himself and VU/DU. He has not tried to get OOB or set off the bed alarm during shift so far. Pt is BSW restraints at the beginning of the shift for tampering with medical devices. Pt asking to come out of restraints but was unable to say why he was placed in restraints. RN untied restraints and observed pt's behaviors. Pt did not pull at anything or try to get OOB by himself. PIV and tubing disguised using a soft sleeve. ABD binder over hips to keep F/C tubing disguised and stat lock replaced. Verbal limits placed regarding pt pulling at medical devices. Pt VU but will likely NR. Pt has a tele-sitter at the bedside. RN called and updated tele-sitter on what to watch for in regards to what medical devices pt had. RN reviewed criteria for remaining out of restraints. Pt VU/DU but will likely NR. Pt is able to answer orientation questions appropriately but is fixated on eating. Pt wants to eat but failed his modified barium swallow. RN has explained this multiple times but pt continues to ask anyone that comes in his room for food. Per nursing report, pt has a palliative care consult because he doesn't want a G tube but is unable to safely swallow. Pt is at has compromised skin integrity. He has preventative mepilex drsgs on B heels and his heels are floated using pillows. He is on a low air loss mattress and is turned Q2H while awake using a positioning wedge. Pt has a mepilex on his sacrum. VS stable on RA t/o shift.

## 2021-01-06 NOTE — PLAN OF CARE
Problem: Falls - Risk of:  Goal: Will remain free from falls  Description: Will remain free from falls  1/6/2021 1503 by April Berkowitz RN  Outcome: Ongoing   Fall risk assessment completed per unit protocol. Patient's bed is in the lowest position, call light is within reach and the patient's room is free of clutter. The patient has been instructed to call for assistance before getting out of bed or the chair. Problem: Skin Integrity:  Goal: Will show no infection signs and symptoms  Description: Will show no infection signs and symptoms  1/6/2021 1503 by April Berkowitz RN  Outcome: Ongoing   Patient's skin has been assessed per unit protocol and the patient is being repositioned or encouraged to turn every two hours to prevent skin breakdown and promote healing. Problem: HEMODYNAMIC STATUS  Goal: Patient has stable vital signs and fluid balance  1/6/2021 1503 by April Berkowitz RN  Outcome: Ongoing   VSS    Problem: COMMUNICATION IMPAIRMENT  Goal: Ability to express needs and understand communication  1/6/2021 1503 by April Berkowitz RN  Outcome: Ongoing   Patient is able to communicate all needs at this time    Problem: Nutrition  Goal: Optimal nutrition therapy  1/6/2021 1503 by April Berkowitz RN  Outcome: Ongoing   Patient is strict NPO due to patient's high risk for aspiration.

## 2021-01-07 VITALS
BODY MASS INDEX: 22.66 KG/M2 | WEIGHT: 158.29 LBS | OXYGEN SATURATION: 98 % | RESPIRATION RATE: 18 BRPM | HEIGHT: 70 IN | TEMPERATURE: 98.4 F | HEART RATE: 85 BPM | SYSTOLIC BLOOD PRESSURE: 96 MMHG | DIASTOLIC BLOOD PRESSURE: 65 MMHG

## 2021-01-07 LAB
ALBUMIN SERPL-MCNC: 2.5 G/DL (ref 3.4–5)
ANION GAP SERPL CALCULATED.3IONS-SCNC: 7 MMOL/L (ref 3–16)
BASOPHILS ABSOLUTE: 0 K/UL (ref 0–0.2)
BASOPHILS RELATIVE PERCENT: 0.8 %
BUN BLDV-MCNC: 11 MG/DL (ref 7–20)
CALCIUM SERPL-MCNC: 7.7 MG/DL (ref 8.3–10.6)
CHLORIDE BLD-SCNC: 102 MMOL/L (ref 99–110)
CO2: 27 MMOL/L (ref 21–32)
CREAT SERPL-MCNC: <0.5 MG/DL (ref 0.8–1.3)
EOSINOPHILS ABSOLUTE: 0.3 K/UL (ref 0–0.6)
EOSINOPHILS RELATIVE PERCENT: 4.4 %
GFR AFRICAN AMERICAN: >60
GFR NON-AFRICAN AMERICAN: >60
GLUCOSE BLD-MCNC: 126 MG/DL (ref 70–99)
GLUCOSE BLD-MCNC: 134 MG/DL (ref 70–99)
GLUCOSE BLD-MCNC: 170 MG/DL (ref 70–99)
GLUCOSE BLD-MCNC: 200 MG/DL (ref 70–99)
HCT VFR BLD CALC: 32.9 % (ref 40.5–52.5)
HEMOGLOBIN: 11.3 G/DL (ref 13.5–17.5)
LYMPHOCYTES ABSOLUTE: 0.8 K/UL (ref 1–5.1)
LYMPHOCYTES RELATIVE PERCENT: 12.8 %
MCH RBC QN AUTO: 30.6 PG (ref 26–34)
MCHC RBC AUTO-ENTMCNC: 34.2 G/DL (ref 31–36)
MCV RBC AUTO: 89.5 FL (ref 80–100)
MONOCYTES ABSOLUTE: 0.6 K/UL (ref 0–1.3)
MONOCYTES RELATIVE PERCENT: 10.2 %
NEUTROPHILS ABSOLUTE: 4.4 K/UL (ref 1.7–7.7)
NEUTROPHILS RELATIVE PERCENT: 71.8 %
PDW BLD-RTO: 12.7 % (ref 12.4–15.4)
PERFORMED ON: ABNORMAL
PHOSPHORUS: 2.6 MG/DL (ref 2.5–4.9)
PLATELET # BLD: 217 K/UL (ref 135–450)
PMV BLD AUTO: 6.3 FL (ref 5–10.5)
POTASSIUM SERPL-SCNC: 3.3 MMOL/L (ref 3.5–5.1)
RBC # BLD: 3.68 M/UL (ref 4.2–5.9)
SODIUM BLD-SCNC: 136 MMOL/L (ref 136–145)
TOTAL CK: 213 U/L (ref 39–308)
WBC # BLD: 6.1 K/UL (ref 4–11)

## 2021-01-07 PROCEDURE — 2580000003 HC RX 258: Performed by: INTERNAL MEDICINE

## 2021-01-07 PROCEDURE — 6360000002 HC RX W HCPCS: Performed by: INTERNAL MEDICINE

## 2021-01-07 PROCEDURE — 80069 RENAL FUNCTION PANEL: CPT

## 2021-01-07 PROCEDURE — 97530 THERAPEUTIC ACTIVITIES: CPT

## 2021-01-07 PROCEDURE — 85025 COMPLETE CBC W/AUTO DIFF WBC: CPT

## 2021-01-07 PROCEDURE — 6360000002 HC RX W HCPCS: Performed by: NURSE PRACTITIONER

## 2021-01-07 PROCEDURE — 99233 SBSQ HOSP IP/OBS HIGH 50: CPT | Performed by: SURGERY

## 2021-01-07 PROCEDURE — 36415 COLL VENOUS BLD VENIPUNCTURE: CPT

## 2021-01-07 PROCEDURE — 82550 ASSAY OF CK (CPK): CPT

## 2021-01-07 PROCEDURE — 2580000003 HC RX 258: Performed by: NURSE PRACTITIONER

## 2021-01-07 PROCEDURE — 94760 N-INVAS EAR/PLS OXIMETRY 1: CPT

## 2021-01-07 PROCEDURE — 6370000000 HC RX 637 (ALT 250 FOR IP): Performed by: INTERNAL MEDICINE

## 2021-01-07 RX ORDER — AMOXICILLIN AND CLAVULANATE POTASSIUM 875; 125 MG/1; MG/1
1 TABLET, FILM COATED ORAL 2 TIMES DAILY
Qty: 6 TABLET | Refills: 0 | Status: SHIPPED | OUTPATIENT
Start: 2021-01-07 | End: 2021-01-10

## 2021-01-07 RX ORDER — AMOXICILLIN AND CLAVULANATE POTASSIUM 500; 125 MG/1; MG/1
1 TABLET, FILM COATED ORAL EVERY 12 HOURS SCHEDULED
Status: DISCONTINUED | OUTPATIENT
Start: 2021-01-07 | End: 2021-01-07

## 2021-01-07 RX ORDER — POLYETHYLENE GLYCOL 3350 17 G/17G
17 POWDER, FOR SOLUTION ORAL DAILY PRN
Qty: 527 G | Refills: 1 | Status: SHIPPED | OUTPATIENT
Start: 2021-01-07 | End: 2021-02-06

## 2021-01-07 RX ORDER — LORAZEPAM 2 MG/ML
1 INJECTION INTRAMUSCULAR ONCE
Status: COMPLETED | OUTPATIENT
Start: 2021-01-07 | End: 2021-01-07

## 2021-01-07 RX ORDER — CASTOR OIL AND BALSAM, PERU 788; 87 MG/G; MG/G
OINTMENT TOPICAL 2 TIMES DAILY
Qty: 30 G | Refills: 0 | Status: SHIPPED | OUTPATIENT
Start: 2021-01-07

## 2021-01-07 RX ADMIN — SODIUM CHLORIDE: 9 INJECTION, SOLUTION INTRAVENOUS at 02:56

## 2021-01-07 RX ADMIN — DEXTROSE AND SODIUM CHLORIDE: 5; 450 INJECTION, SOLUTION INTRAVENOUS at 09:53

## 2021-01-07 RX ADMIN — CASTOR OIL AND BALSAM, PERU: 788; 87 OINTMENT TOPICAL at 09:53

## 2021-01-07 RX ADMIN — CEFTRIAXONE 2 G: 2 INJECTION, POWDER, FOR SOLUTION INTRAMUSCULAR; INTRAVENOUS at 12:13

## 2021-01-07 RX ADMIN — ENOXAPARIN SODIUM 40 MG: 40 INJECTION SUBCUTANEOUS at 09:53

## 2021-01-07 RX ADMIN — LORAZEPAM 1 MG: 2 INJECTION INTRAMUSCULAR; INTRAVENOUS at 18:06

## 2021-01-07 RX ADMIN — ASPIRIN 300 MG: 300 SUPPOSITORY RECTAL at 09:53

## 2021-01-07 NOTE — DISCHARGE SUMMARY
Hospital Medicine Discharge Summary    Patient ID: Mayi Cameron      Patient's PCP: Joana Tyler MD    Admit Date: 1/3/2021     Discharge Date:   1/7/2021    Admitting Physician: Barry Mott MD     Discharge Physician: JACE Coffey CNP       Discharge Diagnoses: Active Hospital Problems    Diagnosis Date Noted    Rhabdomyolysis [M62.82] 01/03/2021       The patient was seen and examined on day of discharge and this discharge summary is in conjunction with any daily progress note from day of discharge. Disposition:  [] Home  [] Home with home health [] Rehab [] Psych [x] SNF  [] LTAC  [] Long term nursing home or group home [] Transfer to ICU  [] Transfer to PCU [] Other:      Discharge Instructions/Follow-up:  PCP as needed    PCP/SNF to follow up: as needed - podiatry : [facility]     D/C condition: stable    Code status: DNR-CC    Activity: as tolerated    Diet: general     D/C Meds: if he can swallow pills, augmentin 875mg bid x3 days     Hospital Course: The patient is a 72 y.o. male w Hx of substance induced psychiatric disorder, Merkel Cell Ca in remission as of 2007, anxiety, and possibly autism per POA report, who presents to [facility] after he was found down. Pt is awake, alert and actively hallucinating with paranoid and ideas of grandiose. He is unable to provide any cohesive Hx albeit he knows he is in the Hospital.   Per EMS reports his caregiver found him supine on the floor in his apartment with front door open, covered in urine and feces, and called EMS. It is unclear how long he was down. Work up in ED concerning for acute psychotic episode, early sepsis - likely from R 3rd toe infection, and mild rhabdomyolysis.  Podiatry was consulted who debrided the toenails    Topical betadine and reassess for topical treatment going forward.   [facility] was started on broad spectrum antibiotics on Admission and we Consulted Infectious Disease. Leukocytosis improved. Consulted vascular surgery - Dr. Padma Philip, who he has seen in the past, for bilat venous insufficiency and toe cellulitis. He recommends ACE wraps for his legs bilaterally, but otherwise no other interventions needed. He was supposed to be wearing stockings in the past, it is unlikely the patient was following this. He has excellent arterial flow bilaterally. intially he was requiring wrist restraints for spontaneous behavior and pulling at lines,etc. For safety issues, but as he cleared and was able to be redirected with improvement of his condition, these were removed but is still somewhat spontaneous, pulls at steward and at IVs despite reminding    He was found to have rhabdo from dehydration and being down on admission. CK is improving with IVF.  Will defer Psych consult for now in the setting of his alertness at least for now and obvious infection. He has a legal guardian POA who was updated with his condition and wishes to pursue palliative care on discharge. Hussein Lawson, Legal guardian, per chart. She states he has home care in the home but he refuses every time. She says it has been a month she thinks since he showered. Obviously there are concerns for his safety at home with his mental status. He did not do well with his barium swallow eval but was not deemed a good candidate for a PEG tube given his mentation and due to his palliative care status and POA wishes, his diet will be liberated understanding as he eats there is the possibility of choking, aspiration and possibly death from these things but would allow the patient to have at least some semblance of quality of life now in this patient who needs nutrition but cannot get a PEG.       Modified barium swallow:  Significant pooling of administered consistencies with subsequent laryngeal   penetration.       Please see separate speech pathology report for full discussion of findings   and SURGERY  IP CONSULT TO GI  IP CONSULT TO HOSPICE    Diagnostic tests: Imaging:  FL MODIFIED BARIUM SWALLOW W VIDEO   Final Result   Significant pooling of administered consistencies with subsequent laryngeal   penetration.       Please see separate speech pathology report for full discussion of findings   and recommendations.           VL Extremity Venous Bilateral   Final Result       MRI BRAIN WO CONTRAST   Final Result   No acute intracranial abnormality.       Mild parenchymal volume loss.       Mild chronic microvascular disease.           XR FOOT RIGHT (MIN 3 VIEWS)   Final Result   Degenerative changes and mild diffuse soft tissue edema.       No acute fracture.           CT HEAD WO CONTRAST   Final Result   No acute intracranial abnormality.       No acute fracture of the cervical or lumbar spine.           CT CERVICAL SPINE WO CONTRAST   Final Result   No acute intracranial abnormality.       No acute fracture of the cervical or lumbar spine.           CT LUMBAR SPINE WO CONTRAST   Final Result   No acute intracranial abnormality.       No acute fracture of the cervical or lumbar spine.           XR CHEST PORTABLE   Final Result   No acute findings           XR PELVIS (1-2 VIEWS)   Final Result   Ossification inferomedial to the left hip joint near the left lesser   trochanter. Findings may be from previous injury or represent soft tissue   calcification. Recommend dedicated radiographs of the left hip for better   characterisation. Labs:  For convenience and continuity at follow-up the following most recent labs are provided:      CBC:    Lab Results   Component Value Date    WBC 6.1 01/07/2021    HGB 11.3 01/07/2021    HCT 32.9 01/07/2021     01/07/2021       Renal:    Lab Results   Component Value Date     01/07/2021    K 3.3 01/07/2021     01/07/2021    CO2 27 01/07/2021    BUN 11 01/07/2021    CREATININE <0.5 01/07/2021    CALCIUM 7.7 01/07/2021    PHOS 2.6 01/07/2021 Discharge Medications:     Current Discharge Medication List           Details   polyethylene glycol (GLYCOLAX) 17 g packet Take 17 g by mouth daily as needed for Constipation  Qty: 527 g, Refills: 1      Balsam [place]-Castor Oil (VENELEX) OINT ointment Apply topically 2 times daily  Qty: 30 g, Refills: 0              Details   pravastatin (PRAVACHOL) 40 MG tablet Take 40 mg by mouth daily. Time Spent on discharge is more than 45 minutes in the examination, evaluation, counseling and review of medications and discharge plan. Signed:    JACE Valdes - CNP   1/7/2021      Thank you Katarzyna Hernandez MD for the opportunity to be involved in this patient's care. If you have any questions or concerns please feel free to contact me at [phone].

## 2021-01-07 NOTE — PROGRESS NOTES
Speech Language Pathology    Chart reviewed and patient discussed with RN. Patient with plan for dc to ECF with hospice. ST to hold tx this date d/t current plan for hospice. ST to re-attempt only if hospice not pursued or if notified of need for skilled ST.     Thank you. Goldie Cortez, [address] [facility], [phone]  Speech-Language Pathologist  Portable phone: [phone]

## 2021-01-07 NOTE — CONSULTS
Department of Podiatric Surgery  History and Physical        CHIEF COMPLAINT:    Chief Complaint   Patient presents with    Altered Mental Status     Was found down by fanacial advisor, unsure how long he was on the floor         Reason for Consultation: Toe wounds and nails    History Obtained From:  patient, electronic medical record    HISTORY OF PRESENT ILLNESS:      The patient is a 72 y.o. male who presents after being found down at home. He is very focused on being NPO. I tried to explain the reason, but he was unable to understand why he can't eat. He notes that his toes hurt a little, but has no real complaints with the feet. The nails are extremely elongated and there is a wound on bilateral 3rd digits which are concerning. The patient is a very poor historian and cannot tell me how long the wounds have been present or any treatment rendered. Apparently he used to see a podiatrist at the [facility], but it is unclear when this was last.     Past Medical History:        Diagnosis Date    Anxiety     Auditory hallucinations     Cancer ([facility] [address]     Hyperlipidemia     Hypokalemia     Merkel cell cancer ([facility] [address] 2007    Remission in 2007    Psychoactive substance-induced organic mood disorder ([facility] [address]     Water retention      Past Surgical History:    History reviewed. No pertinent surgical history. FAMILY HISTORY    History reviewed. No pertinent family history. SOCIAL HISTORY    Social History     Tobacco Use    Smoking status: Former Smoker    Smokeless tobacco: Never Used   Substance Use Topics    Alcohol use: No    Drug use: No       ALLERGIES    No Known Allergies    MEDICATIONS    No current facility-administered medications on file prior to encounter. Current Outpatient Medications on File Prior to Encounter   Medication Sig Dispense Refill    pravastatin (PRAVACHOL) 40 MG tablet Take 40 mg by mouth daily. Allergies:  Patient has no known allergies.     REVIEW OF SYSTEMS:  CONSTITUTIONAL:  positive for  fatigue  GASTROINTESTINAL:  negative  ENDOCRINE:  negative  MUSCULOSKELETAL:  positive for  muscle weakness  NEUROLOGICAL:  negative    PHYSICAL EXAM:  VITALS:  BP (!) 149/78   Pulse 63   Temp 98.1 °F (36.7 °C) (Oral)   Resp 20   Ht 5' 10\" (1.778 m)   Wt 158 lb 11.7 oz (72 kg)   SpO2 96%   BMI 22.78 kg/m²   CONSTITUTIONAL:  awake, alert, cooperative, no apparent distress, and appears stated age  EYES:  pupils equal, round and reactive to light, extra ocular muscles intact, sclera clear, conjunctiva normal      LOWER EXTREMITY:  VASCULAR: Pedal Pulses present. negative signs of ischemia. MUSCULOSKELETAL:  negative gross deformity. NEUROLOGIC:  Epicritic sensation, light touch, joint position sense decreased  SKIN:  The nails are extremely elongated and thickened. There is a soft eschar present on the dorsal DIPJ of the right 3rd digit without evidence of drainage or malodor. The left 3rd digit has a large hyperkeratotic lesion at the distal aspect. Upon removal of an overlying cap of tissues there is evidence of a shallow ulceration with positive malodor and no active drainage. The wound measures 0.5 x 0.7 x 0.2cm with a soft end. I/O:    Intake/Output Summary (Last 24 hours) at 1/6/2021 2338  Last data filed at 1/6/2021 2150  Gross per 24 hour   Intake 606.1 ml   Output 1850 ml   Net -1243.9 ml              Wt Readings from Last 3 Encounters:   01/06/21 158 lb 11.7 oz (72 kg)   03/06/18 216 lb (98 kg)   01/16/18 201 lb (91.2 kg)       LABS:    Recent Labs     01/05/21  0529 01/06/21  0504   WBC 6.1 6.0   HGB 10.5* 11.0*   HCT 30.7* 32.1*    200        Recent Labs     01/06/21  0504      K 3.5      CO2 23   PHOS 2.5   BUN 19   CREATININE <0.5*      No results for input(s): PROT, INR, APTT in the last 72 hours.     ASSESSMENT:   Onychomycosis  Onychogryphosis  Neuropathic ulceration distal 3rd digit, left foot  Abrasion dorsal 3rd digit, right foot. PLAN:  Evaluation and Management x 30 minutes with greater than 50% of the time spent with the patient discussing the etiology and treatment options of the chief complaint. 1. Onychomycosis/onychogryphosis   - Debrided all nails, bilateral feet. 2. Left 3rd digit   - Ulceration appears to be shallow. - Topical betadine tonight and will reassess tomorrow for needs of different topical treatment. DISPO: Will follow. Thanks for the opportunity to participate in this patient's care.      Alexys Yu DPM  [facility]  Pager: [phone]  Office: [phone]  Fax: [phone]

## 2021-01-07 NOTE — PROGRESS NOTES
Occupational Therapy  Facility/Department: [facility] 2T PROGRESSIVE CARE  Daily Treatment Note  NAME: Laura Lenz  : 1950  MRN: [id number]    Date of Service: 2021    Discharge Recommendations:  3-5 sessions per week, Patient would benefit from continued therapy after discharge    Laura Lenz scored a  on the AM-PAC ADL Inpatient form. Current research shows that an AM-PAC score of 17 or less is typically not associated with a discharge to the patient's home setting. Based on the patient's AM-PAC score and their current ADL deficits, it is recommended that the patient have 3-5 sessions per week of Occupational Therapy at d/c to increase the patient's independence. Please see assessment section for further patient specific details. If patient discharges prior to next session this note will serve as a discharge summary. Please see below for the latest assessment towards goals. Assessment   Performance deficits / Impairments: Decreased functional mobility ; Decreased safe awareness;Decreased balance;Decreased ADL status; Decreased cognition;Decreased endurance;Decreased high-level IADLs;Decreased strength  Assessment: Discussed with OTR:Pt required Max A x1 today for bed mob, supine to sit. Pt with improved sitting balance at EOB, SBA. Pt stood from bed, recliner, onto luis manuel stedy lift with Max A x1-2 and not able to come to full upright stance, expressing fear of falling. Pt limited by the above deficits and not safe to return home. Cont to recommend low-mod frequency therapy at d/c. Cont poc. Prognosis: Fair  History: Pt presents after being found down by POA and covered in stool/urine. Pt with a history of Autism but was living alone with MOWs and HHA every other week to assist with laundry.   OT Education: OT Role;Plan of Care;Orientation  Barriers to Learning: autism, delusions  REQUIRES OT FOLLOW UP: Yes  Activity Tolerance  Activity Tolerance: Treatment limited secondary to decreased cognition;Patient limited by fatigue  Safety Devices  Safety Devices in place: Yes  Type of devices: Call light within reach;Nurse notified; Patient at risk for falls; Left in chair;Chair alarm in place;Gait belt         Patient Diagnosis(es): The primary encounter diagnosis was Traumatic rhabdomyolysis, initial encounter ([facility] [address] Diagnoses of Dehydration, Urinary tract infection with hematuria, site unspecified, and Delirium were also pertinent to this visit. has a past medical history of Anxiety, Auditory hallucinations, Cancer ([facility] [address] Hyperlipidemia, Hypokalemia, Merkel cell cancer ([facility] [address] Psychoactive substance-induced organic mood disorder ([facility] [address] and Water retention. has no past surgical history on file. Restrictions  Restrictions/Precautions  Restrictions/Precautions: Fall Risk  Position Activity Restriction  Other position/activity restrictions: IV, telemetry; steward, telesitter, binder over steward tubing  Subjective   General  Chart Reviewed: Yes, Orders, Progress Notes  Patient assessed for rehabilitation services?: Yes  Additional Pertinent Hx: Per Dr. Sebas Ibarra, \"The patient is a 72 y.o. male w Hx of substance induced psychiatric disorder, Merkel Cell Ca in remission as of 2007, anxiety, who presents to [facility] after he was found down. Pt is awake, alert and actively hallucinating with paranoid and ideas of grandiose. He is unable to provide any cohesive Hx albeit he knows he is in the Hospital.  Per EMS reports his caregiver found him supine on the floor in his apartment with front door open, covered in urine and feces, and called EMS. It is unclear how long he was down. Work up in ED concerning for acute psychotic episode, early sepsis - likely from R 3rd toe infection, and mild rhabdomyolysis. \"  Family / Caregiver Present: No  Referring Practitioner: Dr. Sebas Ibarra  Diagnosis: Rhabdomyosis  Subjective  Subjective: Pt met Bs,in bed. PCA present to [facility] with transfers (using luis manuel robles). Pt without complaints except continual request to eat. Orientation  Orientation  Overall Orientation Status: Impaired  Orientation Level: Oriented to person;Oriented to time;Disoriented to situation;Disoriented to place  Objective     ADL's-Mod A to comb pt's hair. Anticipate pt would require overall Max/dep for bathing/dressing based on ROM, balance observed. Balance  Sitting Balance: Stand by assistance(at EOB x4-5 min, without light UE support, and pt sitting in midline, with slight posterior lean.)  Standing Balance  Time: 1 min or less  Activity: Static stance on luis manuel stedy with Max A x1-2 and pt unable to come to full upright stance, expressing fear of falling. Wheelchair Bed Transfers  Level of Asssistance: Dependent/Total  Wheelchair Transfers Comments: bed to recliner via luis manuel stedy lift  Bed mobility  Rolling to Left: Moderate assistance(x1; use of rail)  Supine to Sit: Maximum assistance(x1; use of rail; HOB slightly raised)  Scooting: Maximal assistance(x1)  Transfers  Sit to stand: Moderate assistance;Maximum assistance;2 Person assistance  Stand to sit: Moderate assistance;Maximum assistance  Transfer Comments: Initial Mod/Max x2 off elevated bed. Pt pulls self up 1/2 way, requires assist to stand upright. Max A x1 to 1/2 stance at recliner onto [facility] (for RN in to give suppository). Cognition  Overall Cognitive Status: Exceptions  Following Commands: Follows one step commands with increased time; Follows one step commands with repetition  Attention Span: Attends with cues to redirect  Memory: Decreased recall of precautions;Decreased short term memory  Safety Judgement: Decreased awareness of need for assistance;Decreased awareness of need for safety  Problem Solving: Assistance required to generate solutions;Assistance required to implement solutions;Decreased awareness of errors  Insights: Not aware of deficits  Initiation: Requires cues for all  Sequencing: Requires cues for some  Cognition Comment: Fixated on not being allowed to eat-says he swallows fine       Plan   Plan  Times per week: 3-5  Times per day: Daily  Plan weeks: 1  Current Treatment Recommendations: Strengthening, Endurance Training, Patient/Caregiver Education & Training, Cognitive Reorientation, Self-Care / ADL, Balance Training, Home Management Training, Functional Mobility Training, Safety Education & Training    AM-PAC Score        AM-PAC Inpatient Daily Activity Raw Score: 11 (01/07/21 1013)  AM-PAC Inpatient ADL T-Scale Score : 29.04 (01/07/21 1013)  ADL Inpatient CMS 0-100% Score: 70.42 (01/07/21 1013)  ADL Inpatient CMS G-Code Modifier : CL (01/07/21 1013)    Goals  Short term goals  Time Frame for Short term goals: 1 week. status: goals ongoing  Short term goal 1: Pt will be min A with functional transfers  Short term goal 2: Pt will be min A with functional mobility  Short term goal 3: Pt will be min A with bathing and dressing  Short term goal 4: Pt will be min A with bed mobility  Short term goal 5: Pt will be min A with toileting  Long term goals  Time Frame for Long term goals : TBD at next level of care  Patient Goals   Patient goals : Pt repeatedly asking to go home during session.        Therapy Time   Individual Concurrent Group Co-treatment   Time In 0930         Time Out 1008         Minutes [address] JJ/L,515

## 2021-01-07 NOTE — PROGRESS NOTES
RN called report to Vazquez at [facility] post acute. All questions answered. Left unit number as call back number. Pt will be picked up at 1830.

## 2021-01-07 NOTE — PLAN OF CARE
Problem: Falls - Risk of:  Goal: Will remain free from falls  Description: Will remain free from falls  1/7/2021 0226 by Mary Kate Decker RN  Outcome: Ongoing     Problem: Falls - Risk of:  Goal: Absence of physical injury  Description: Absence of physical injury  Outcome: Ongoing     Problem: Skin Integrity:  Goal: Will show no infection signs and symptoms  Description: Will show no infection signs and symptoms  1/7/2021 0226 by Mary Kate Decker RN  Outcome: Ongoing     Problem: Skin Integrity:  Goal: Absence of new skin breakdown  Description: Absence of new skin breakdown  Outcome: Ongoing     Problem: HEMODYNAMIC STATUS  Goal: Patient has stable vital signs and fluid balance  1/7/2021 0226 by Mary Kate Decker RN  Outcome: Ongoing     Problem: ACTIVITY INTOLERANCE/IMPAIRED MOBILITY  Goal: Mobility/activity is maintained at optimum level for patient  Outcome: Ongoing     Problem: COMMUNICATION IMPAIRMENT  Goal: Ability to express needs and understand communication  1/7/2021 0226 by Mary Kate Decker RN  Outcome: Ongoing     Problem: Nutrition  Goal: Optimal nutrition therapy  1/7/2021 0226 by Mary Kate Decker RN  Outcome: Ongoing

## 2021-01-07 NOTE — PROGRESS NOTES
recorded.       Physical Examination:   General appearance - oriented to person, place, and time and chronically ill appearing  Mental status - uncooperative  Neck - supple, no significant adenopathy  Chest - clear to auscultation, no wheezes, rales or rhonchi, symmetric air entry  Abdomen - soft, nontender, nondistended, no masses or organomegaly  Neurological - motor and sensory grossly normal bilaterally  Skin - normal coloration and turgor, no rashes, no suspicious skin lesions noted       Signed By: Electronically signed by JACE Cherry CNP on 1/7/2021 at 9:47 AM   Palliative Medicine   [id number]    January 7, 2021

## 2021-01-07 NOTE — CARE COORDINATION
NAOMI spoke with [facility], Tung Chapman, who reports they are able to accept pt today. MD has ordered hospice but pt doesn't have a payor for hospice. NAOMI spoke with legal guardian LAJABARI who understands this and is agreeable for pt to come skilled then NF will work with the guardian on finances to get hospice as well. VERA and MD aware of this and agreeable. Transport scheduled for 630 per [facility] transport.     Zach skilled to [facility] post acute  Report [phone]  Electronically signed by JAYSON Carpio on 1/7/2021 at 2:00 PM

## 2021-01-07 NOTE — PLAN OF CARE
Problem: Falls - Risk of:  Goal: Will remain free from falls  Description: Will remain free from falls  Outcome: Ongoing   Fall risk assessment completed . Fall precautions in place, bed/ chair alarm on, side rails 2/4 up, call light in reach, educated pt on calling for assistance when needed, room clear of clutter. Pt verbalized understanding. Problem: Skin Integrity:  Goal: Will show no infection signs and symptoms  Description: Will show no infection signs and symptoms  Outcome: Ongoing   Skin assessment completed every shift. Pt assessed for incontinence, appropriate barrier cream applied prn. Pt encouraged to turn/rotate every 2 hours. Assistance provided if pt unable to do so themselves. Problem: Nutrition  Goal: Optimal nutrition therapy  Outcome: Ongoing   Nutrition status assessed and documented. Patient encouraged to take time while eating. Patient educated on the importance of sodium and fluid intake in meals. Will continue to monitor.

## 2021-01-07 NOTE — PROGRESS NOTES
Decision for hospice noted. Will sign off at this time. The ISABELA was updated to reflect necessary wound care for the 3rd toe wounds.     Raulito Anguiano DPM  [facility]  Pager: [phone]  Office: [phone]  Fax: [phone]

## 2021-01-07 NOTE — DISCHARGE INSTR - COC
Continuity of Care Form    Patient Name: Werner Haas   :  1950  MRN:  [id number]    Admit date:  1/3/2021  Discharge date:  21    Code Status Order: [facility]   Advance Directives:   [facility] Documentation       Date/Time Healthcare Directive Type of Healthcare Directive Copy in [address] [facility] [address] Agent's Name Healthcare Agent's Phone Number    21  Yes, patient has an advance directive for healthcare treatment  Durable power of  for health care  --  --  --  --            Admitting Physician:  Yesenia Severino MD  PCP: Sunshine Burns MD    Discharging Nurse: [facility] Unit/Room#: [id number]-5618/5959-48  Discharging Unit Phone Number: [phone]    Emergency Contact:   Extended Emergency Contact Information  Primary Emergency Contact: Lynda Sevilla  Work Phone: [phone]  Mobile Phone: [phone]  Relation: Legal Guardian  Secondary Emergency Contact: Marissa Montano  Mobile Phone: [phone]  Relation: Other    Past Surgical History:  History reviewed. No pertinent surgical history.     Immunization History:   Immunization History   Administered Date(s) Administered    Influenza Vaccine, unspecified formulation 10/31/2016    Influenza Virus Vaccine 2017       Active Problems:  Patient Active Problem List   Diagnosis Code    Syncope R55    Localized edema R60.0    Mixed hyperlipidemia E78.2    Bilateral lower extremity edema R60.0    Venous stasis dermatitis of both lower extremities I87.2    Acute delirium R41.0    Depression with anxiety F41.8    Cellulitis of toe of left foot L03.032    Rhabdomyolysis M62.82       Isolation/Infection:   Isolation            No Isolation          Patient Infection Status       Infection Onset Added Last Indicated Last Indicated By Review Planned Expiration Resolved Resolved By    None active    Resolved    COVID-19 Rule Out 21 COVID-19 (Ordered)   21 Rule-Out Test Resulted            Nurse Assessment:  Last Vital Signs: BP (!) 89/52   Pulse 80   Temp 98.1 °F (36.7 °C) (Axillary)   Resp 18   Ht 5' 10\" (1.778 m)   Wt 158 lb 4.6 oz (71.8 kg)   SpO2 97%   BMI 22.71 kg/m²     Last documented pain score (0-10 scale): Pain Level: 0  Last Weight:   Wt Readings from Last 1 Encounters:   01/07/21 158 lb 4.6 oz (71.8 kg)     Mental Status:  disoriented    IV Access:  - None    Nursing Mobility/ADLs:  Walking   Dependent  Transfer  Dependent  Bathing  Dependent  Dressing  Dependent  Toileting  Dependent  Feeding  Assisted  Med Admin  Assisted  Med Delivery   crushed and prefers mixed with pudding     Wound Care Documentation and Therapy:  Wound 01/04/21 Toe (Comment  which one) Anterior; Left (Active)   Wound Image    01/04/21 1200   Wound Etiology Arterial 01/04/21 2030   Dressing Status Intact 01/07/21 0800   Wound Cleansed Wound cleanser 01/07/21 0800   Dressing/Treatment Betadine swabs/povidone iodine;Open to air 01/07/21 0800   Offloading for Diabetic Foot Ulcers Yes (type) 01/05/21 2145   Dressing Change Due 01/07/21 01/07/21 0148   Wound Assessment Eschar dry 01/07/21 0800   Drainage Amount None 01/07/21 0148   Odor None 01/05/21 2145   Dawn-wound Assessment Fragile;Dry/flaky 01/05/21 2145   Margins Defined edges 01/05/21 2145   Number of days: 3       Wound 01/04/21 Toe (Comment  which one) Anterior;Right (Active)   Wound Image   01/04/21 1200   Wound Etiology Other 01/04/21 2030   Dressing Status Intact 01/05/21 2145   Wound Cleansed Wound cleanser 01/07/21 0800   Dressing/Treatment Betadine swabs/povidone iodine;Open to air 01/07/21 0800   Offloading for Diabetic Foot Ulcers Yes (type) 01/05/21 2145   Dressing Change Due 01/07/21 01/07/21 0148   Wound Length (cm) 0.5 cm 01/04/21 1200   Wound Width (cm) 2 cm 01/04/21 1200   Wound Surface Area (cm^2) 1 cm^2 01/04/21 1200   Wound Assessment Eschar dry 01/07/21 0800   Drainage Amount None 01/07/21 0800   Odor None 01/05/21 2145   Dawn-wound Assessment Fragile;Dry/flaky 01/05/21 2145   Margins Defined edges 01/05/21 2145   Number of days: 3       Wound 01/04/21 Foot Right;Plantar (Active)   Wound Image   01/04/21 1200   Wound Etiology Other 01/04/21 2030   Dressing Status Intact 01/05/21 2145   Wound Cleansed Not Cleansed 01/07/21 0800   Dressing/Treatment Betadine swabs/povidone iodine;Open to air 01/07/21 0800   Offloading for Diabetic Foot Ulcers Yes (type) 01/05/21 2145   Dressing Change Due 01/06/21 01/05/21 2145   Wound Assessment Eschar dry 01/05/21 0937   Drainage Amount None 01/05/21 0937   Odor None 01/05/21 0937   Dawn-wound Assessment Dry/flaky 01/05/21 0937   Number of days: 3       Wound 01/04/21 Coccyx Left;Dorsal pressure left upper coccyx (Active)   Wound Image   01/04/21 1200   Wound Etiology Pressure Unstageable 01/07/21 0800   Dressing/Treatment Barrier film;Silicone border 72/74/47 0800   Dressing Change Due 01/13/21 01/07/21 0800   Wound Length (cm) 6 cm 01/04/21 1200   Wound Width (cm) 9 cm 01/04/21 1200   Wound Depth (cm) 0 cm 01/04/21 1200   Wound Surface Area (cm^2) 54 cm^2 01/04/21 1200   Wound Volume (cm^3) 0 cm^3 01/04/21 1200   Wound Assessment Pink/red;Fluid filled blister 01/05/21 2145   Drainage Amount None 01/07/21 0800   Odor None 01/05/21 2145   Dawn-wound Assessment Intact 01/05/21 2145   Margins Defined edges 01/05/21 2145   Number of days: 3        Elimination:  Continence: Bowel: No  Bladder: No  Urinary Catheter: Indication for Use of Catheter: Acute urinary retention/obstruction   Colostomy/Ileostomy/Ileal Conduit: No       Date of Last BM: 01/05/21    Intake/Output Summary (Last 24 hours) at 1/7/2021 1316  Last data filed at 1/7/2021 1031  Gross per 24 hour   Intake 1200 ml   Output 3900 ml   Net -2700 ml     I/O last 3 completed shifts:   In: 1200 [I.V.:1200]  Out: 3800 [Urine:3800]    Safety Concerns:     Sundowners [place]rome, History of Falls (last 30 days), At Risk for Falls and Aspiration Risk    Impairments/Disabilities:      Hearing    Nutrition Therapy:  Current Nutrition Therapy:   - Oral Diet:  General    Routes of Feeding: Oral  Liquids: No Restrictions  Daily Fluid Restriction: no  Last Modified Barium Swallow with Video (Video Swallowing Test): done on january 2021, however pt did fail /0    Treatments at the Time of Hospital Discharge:   Respiratory Treatments: as needed  Oxygen Therapy:  is not on home oxygen therapy. Ventilator:    - No ventilator support    Rehab Therapies: Physical Therapy and Occupational Therapy  Weight Bearing Status/Restrictions: No weight bearing restirctions  Other Medical Equipment (for information only, NOT a DME order):  hospital bed  Other Treatments: none    Patient's personal belongings (please select all that are sent with patient):  all sent with patient     RN SIGNATURE:  Electronically signed by Gray Weston RN on 1/7/21 at 1:19 PM EST    CASE MANAGEMENT/SOCIAL WORK SECTION    Inpatient Status Date: ***    Readmission Risk Assessment Score:  Readmission Risk              Risk of Unplanned Readmission:        17           Discharging to Facility/ Agency   Name:   Address:  Phone:  Fax:    Dialysis Facility (if applicable)   Name:  Address:  Dialysis Schedule:  Phone:  Fax:    / signature: {Esignature:[id number]}    PHYSICIAN SECTION    Prognosis: {Prognosis:9753703964}    Condition at Discharge: Kayleigh Ramos Patient Condition:[id number]}    Rehab Potential (if transferring to Rehab): {Prognosis:3336001795}    Recommended Labs or Other Treatments After Discharge: ***  Daily wound care to the 3rd toe wounds.  Betadine and light gauze dressing daily    Bernadette Ruelas DPM  [facility]  Pager: [phone]  Office: [phone]  Fax: [phone]        Physician Certification: I certify the above information and transfer of Nely Jacobs  is necessary for the continuing treatment of the diagnosis listed and that he requires [facility] for less 30 days.      Update Admission H&P: No change in H&P    PHYSICIAN SIGNATURE:  Electronically signed by JACE Shanks CNP on 1/7/21 at 6:31 PM EST

## 2021-01-07 NOTE — PROGRESS NOTES
Surgery Consult Note     Lana Galvan MD  Pt Name: Kaiden Beavers  MRN: [id number]  YOB: 1950  Date of evaluation: 1/7/2021  Primary Care Physician: Melba Butts MD  Referring Physician. Manas Levine  Reason for Consultation: Chronic venous disease bilaterally  Chief Complaint: Patient was found down he reports multiple  falls  onfusion chbbhhhTesting testing T chronic esting testing found down face down not sure how long he been down found on the ground face down unclear how long it been there with the front door open  1. Chronic venous insufficiency edema left greater than right not tender not tense found down at home has some rhabdomyolysis  back to normal range. No DVT bilat duplex  2. Excellent arterial circulation to the feet  3. Ulceration of third toe on both sides right side on the dorsum left side on end of toe I am not impressed that he has cellulitis. 4. Confusion acute psychotic episode history of  psychoactive substance induced organic mood disorder and history of hallucinations  5. Swallowing disorder  6. Still begging to eat says he has not had any food in 5 days perhaps the swallowing could be reevaluated sitting up with small amounts? PLANS:   1. Appreciate podiatry consult to debride 2 ulcers and trim his nails yesterday all look better report that left and toe ulcer is shallow, right third toe dry noninfected  2. May need ACE wraps to legs as he gets moving again no tight edema though  3. I saw him in 2018 and we ordered stockings I do not know that he ever got them or used them  SUBJECTIVE:   History of Chief Complaint:    Kaiden Beavers is a 72 y.o. male who presents with brought in January 3 after being found down symptom onset has been unknown or a time period of unknown. Severity is described as mild . Course of his symptoms over time is acute. . This has been occuring for 3 days at least, decreasing and have stabilized. Aggravating factors include none. Alleviating factors include none. Associated symptoms include none. 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 Past Surgical History  Reviewed has no past surgical history on file. Medications  Prior to Admission medications    Medication Sig Start Date End Date Taking? Authorizing Provider   pravastatin (PRAVACHOL) 40 MG tablet Take 40 mg by mouth daily. Yes Historical Provider, MD    Scheduled Meds:   amoxicillin-clavulanate  1 tablet Oral 2 times per day    lactobacillus  1 capsule Oral Daily with breakfast    Venelex   Topical BID    pravastatin  40 mg Oral Daily    [Held by provider] QUEtiapine  50 mg Oral BID    sodium chloride flush  10 mL Intravenous 2 times per day    enoxaparin  40 mg Subcutaneous Daily    aspirin  81 mg Oral Daily    Or    aspirin  300 mg Rectal Daily    influenza virus vaccine  0.5 mL Intramuscular Prior to discharge     Continuous Infusions:   dextrose      dextrose 5 % and 0.45 % NaCl 100 mL/hr at 01/07/21 0953    sodium chloride 100 mL/hr at 01/07/21 0256     PRN Meds:.glucose, dextrose, glucagon (rDNA), dextrose, acetaminophen, sodium chloride flush, promethazine **OR** ondansetron, polyethylene glycol  Allergies  has No Known Allergies. Family History  Reviewedfamily history is not on file. Social History   reports that he has quit smoking. He has never used smokeless tobacco. He reports that he does not drink alcohol or use drugs. EDUCATION  Patient educated about their illness/diagnosis, stated above, and all questions answered.  We discussed the importance of nutrition, medications they are taking, and healthy lifestyle. Review of Systems:  Unable to give a good review of systems  OBJECTIVE:   VITALS:  height is 5' 10\" (1.778 m) and weight is 158 lb 4.6 oz (71.8 kg). His oral temperature is 98.1 °F (36.7 °C). His blood pressure is 125/77 and his pulse is 62. His respiration is 18 and oxygen saturation is 96%. CONSTITUTIONAL: awake, alert, cooperative, no apparent distress, and appears stated age and not clearly oriented  SKIN: Skin color, texture, turgor normal. No rashes or lesions. LYMPH: no cervical nodes, no inguinal nodes  HEENT: Head is normocephalic, atraumatic. EOMI, PERRLA. NECK: Supple, symmetrical, trachea midline, no adenopathy, thyroid symmetric, not enlarged and no tenderness, skin normal.  CHEST/LUNGS: chest symmetric with normal A/P diameter, normal respiratory rate and rhythm, lungs clear to auscultation without wheezes, rales or rhonchi. No accessory muscle use. Scars None   CARDIOVASCULAR: Heart sounds are normal.  Regular rate and rhythm without murmur, gallop or rub. Normal S1 and S2. . Carotid and femoral pulses 2+/4 and equal bilaterally. ABDOMEN: Normal shape. No and Laparoscopic scar(s) present. Normal bowel sounds. No bruits. . soft, nontender, nondistended, no masses or organomegaly. no evidence of hernia. Percussion: Normal without hepatosplenomegally. Tenderness: absent. RECTAL: deferred, not clinically indicated  NEUROLOGIC: Mental Status Exam:  Level of Alertness:   awake. EXTREMITIES: no cyanosis, no clubbing and no edema.   LABS:     Recent Labs     01/05/21  0529 01/06/21  0504 01/07/21  0539 01/07/21  0543   WBC 6.1 6.0  --  6.1   HGB 10.5* 11.0*  --  11.3*   HCT 30.7* 32.1*  --  32.9*    200  --  217    137 136  --    K 3.8 3.5 3.3*  --     105 102  --    CO2 25 23 27  --    BUN 27* 19 11  --    CREATININE <0.5* <0.5* <0.5*  --    PHOS 2.6 2.5 2.6  --    CALCIUM 8.0* 7.8* 7.7*  --      Recent Labs     01/07/21  0539   LABALBU 2.5*     CBC with Differential: Lab Results   Component Value Date    WBC 6.1 01/07/2021    RBC 3.68 01/07/2021    HGB 11.3 01/07/2021    HCT 32.9 01/07/2021     01/07/2021    MCV 89.5 01/07/2021    MCH 30.6 01/07/2021    MCHC 34.2 01/07/2021    RDW 12.7 01/07/2021    LYMPHOPCT 12.8 01/07/2021    MONOPCT 10.2 01/07/2021    BASOPCT 0.8 01/07/2021    MONOSABS 0.6 01/07/2021    LYMPHSABS 0.8 01/07/2021    EOSABS 0.3 01/07/2021    BASOSABS 0.0 01/07/2021     CMP:    Lab Results   Component Value Date     01/07/2021    K 3.3 01/07/2021     01/07/2021    CO2 27 01/07/2021    BUN 11 01/07/2021    CREATININE <0.5 01/07/2021    GFRAA >60 01/07/2021    AGRATIO 1.2 01/03/2021    LABGLOM >60 01/07/2021    GLUCOSE 126 01/07/2021    PROT 6.8 01/03/2021    LABALBU 2.5 01/07/2021    CALCIUM 7.7 01/07/2021    BILITOT 0.8 01/03/2021    ALKPHOS 75 01/03/2021     01/03/2021    ALT 42 01/03/2021     BMP:    Lab Results   Component Value Date     01/07/2021    K 3.3 01/07/2021     01/07/2021    CO2 27 01/07/2021    BUN 11 01/07/2021    LABALBU 2.5 01/07/2021    CREATININE <0.5 01/07/2021    CALCIUM 7.7 01/07/2021    GFRAA >60 01/07/2021    LABGLOM >60 01/07/2021    GLUCOSE 126 01/07/2021     Urine Culture:  No components found for: ISAIAH  RADIOLOGY:     CT head MRI of head no acute changes thank you for the interesting evaluation.    Venous duplex scan no DVT/ SVT    General and Vascular Surgery [phone]  Electronically signed by Abhi Dotson MD on 1/7/2021 at 9:54 AM

## 2021-01-08 LAB
BLOOD CULTURE, ROUTINE: NORMAL
CULTURE, BLOOD 2: NORMAL

## 2022-09-01 NOTE — ED TRIAGE NOTES
Fall risk screening completed. Fall risk bracelet applied to patient. Non-skid socks provided and placed on patient. The fall risk is indicated using  dome light . Based on score, a bed alarm was not indicated. The call light is within the patient's reach, and instructions for use were provided. The bed is in the lowest position with wheels locked. The patient has been advised to notify staff, using the call light, if there is a need to get up or use restroom. The patient verbalized understanding of safety precautions and how to contact staff for assistance. Detail Level: None Ndc #: 5650-6912-90 Hide Second Medication?: No Bill J-Code: yes Post-Care Instructions: I reviewed with the patient in detail post-care instructions. Patient understands to keep the injection sites clean and call the clinic if there is any redness, swelling or pain. Medication (2) And Associated J-Code Units: Triamcinolone acetonide, 10mg Lot # (Optional): [id number] Expiration Date (Optional): 09/2023 Procedure Information: Please note that the numeric value listed in the Medication (1) and associated J-code units and Medication (2) and associated J-code units variables are j-code amounts and do not represent either the concentration or the total amount of the medications injected.  I strongly recommend selecting no to the Render J-code information in note question. This will allow your note to be more clear. If you are billing j-codes with your injection codes you need to document the total amount of the medication injected. This amount should match the j-code units. For example, if you are injecting Triamcinolone 40mg as an intramuscular injection you would select 40 for the dose field and mg for the units. This would allow you to document  with 4 units (40mg = 10mg x 4). The total volume is not used to calculate j-codes only the amount of the medication administered. Dose Administered (Numbers Only - Mg, G, Mcg, Units, Cc): 4 mL Route: IL Dose Administered (Numbers Only - Mg, G, Mcg, Units, Cc): 0 Consent: The risks of the medication were reviewed with the patient.

## 2022-10-22 ENCOUNTER — APPOINTMENT (OUTPATIENT)
Dept: CT IMAGING | Age: 72
End: 2022-10-22
Payer: MEDICARE

## 2022-10-22 ENCOUNTER — HOSPITAL ENCOUNTER (EMERGENCY)
Age: 72
Discharge: NURSING, REHAB OR LONG-TERM CARE | End: 2022-10-22
Attending: EMERGENCY MEDICINE
Payer: MEDICARE

## 2022-10-22 ENCOUNTER — APPOINTMENT (OUTPATIENT)
Dept: GENERAL RADIOLOGY | Age: 72
End: 2022-10-22
Payer: MEDICARE

## 2022-10-22 VITALS
HEIGHT: 72 IN | HEART RATE: 79 BPM | DIASTOLIC BLOOD PRESSURE: 74 MMHG | BODY MASS INDEX: 22.57 KG/M2 | WEIGHT: 166.67 LBS | RESPIRATION RATE: 15 BRPM | TEMPERATURE: 98.4 F | OXYGEN SATURATION: 99 % | SYSTOLIC BLOOD PRESSURE: 105 MMHG

## 2022-10-22 DIAGNOSIS — S01.01XA LACERATION OF SCALP, INITIAL ENCOUNTER: ICD-10-CM

## 2022-10-22 DIAGNOSIS — N30.00 ACUTE CYSTITIS WITHOUT HEMATURIA: ICD-10-CM
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
## 2022-10-22 LAB
A/G RATIO: 1.2 (ref 1.1–2.2)
ALBUMIN SERPL-MCNC: 3.2 G/DL (ref 3.4–5)
ALP BLD-CCNC: 54 U/L (ref 40–129)
ALT SERPL-CCNC: 12 U/L (ref 10–40)
ANION GAP SERPL CALCULATED.3IONS-SCNC: 8 MMOL/L (ref 3–16)
AST SERPL-CCNC: 19 U/L (ref 15–37)
BACTERIA: NORMAL /HPF
BASOPHILS ABSOLUTE: 0 K/UL (ref 0–0.2)
BASOPHILS RELATIVE PERCENT: 0.3 %
BILIRUB SERPL-MCNC: 0.4 MG/DL (ref 0–1)
BILIRUBIN URINE: NEGATIVE
BLOOD, URINE: NEGATIVE
BUN BLDV-MCNC: 27 MG/DL (ref 7–20)
CALCIUM SERPL-MCNC: 8.4 MG/DL (ref 8.3–10.6)
CHLORIDE BLD-SCNC: 102 MMOL/L (ref 99–110)
CLARITY: CLEAR
CO2: 26 MMOL/L (ref 21–32)
COLOR: YELLOW
CREAT SERPL-MCNC: 0.6 MG/DL (ref 0.8–1.3)
EOSINOPHILS ABSOLUTE: 0 K/UL (ref 0–0.6)
EOSINOPHILS RELATIVE PERCENT: 0.3 %
EPITHELIAL CELLS, UA: 0 /HPF (ref 0–5)
GFR SERPL CREATININE-BSD FRML MDRD: >60 ML/MIN/{1.73_M2}
GLUCOSE BLD-MCNC: 86 MG/DL (ref 70–99)
GLUCOSE URINE: NEGATIVE MG/DL
HCT VFR BLD CALC: 31.4 % (ref 40.5–52.5)
HEMOGLOBIN: 10.6 G/DL (ref 13.5–17.5)
HYALINE CASTS: 2 /LPF (ref 0–8)
KETONES, URINE: 40 MG/DL
LEUKOCYTE ESTERASE, URINE: ABNORMAL
LYMPHOCYTES ABSOLUTE: 0.6 K/UL (ref 1–5.1)
LYMPHOCYTES RELATIVE PERCENT: 6.8 %
MCH RBC QN AUTO: 33.2 PG (ref 26–34)
MCHC RBC AUTO-ENTMCNC: 33.8 G/DL (ref 31–36)
MCV RBC AUTO: 98.5 FL (ref 80–100)
MICROSCOPIC EXAMINATION: YES
MONOCYTES ABSOLUTE: 0.9 K/UL (ref 0–1.3)
MONOCYTES RELATIVE PERCENT: 9.4 %
NEUTROPHILS ABSOLUTE: 7.6 K/UL (ref 1.7–7.7)
NEUTROPHILS RELATIVE PERCENT: 83.2 %
NITRITE, URINE: NEGATIVE
PDW BLD-RTO: 14.2 % (ref 12.4–15.4)
PH UA: 6 (ref 5–8)
PLATELET # BLD: 227 K/UL (ref 135–450)
PMV BLD AUTO: 6.8 FL (ref 5–10.5)
POTASSIUM SERPL-SCNC: 4.2 MMOL/L (ref 3.5–5.1)
PRO-BNP: 718 PG/ML (ref 0–124)
PROTEIN UA: ABNORMAL MG/DL
RBC # BLD: 3.19 M/UL (ref 4.2–5.9)
RBC UA: 1 /HPF (ref 0–4)
SODIUM BLD-SCNC: 136 MMOL/L (ref 136–145)
SPECIFIC GRAVITY UA: 1.02 (ref 1–1.03)
TOTAL CK: 55 U/L (ref 39–308)
TOTAL PROTEIN: 5.8 G/DL (ref 6.4–8.2)
TROPONIN: <0.01 NG/ML
URINE REFLEX TO CULTURE: ABNORMAL
URINE TYPE: ABNORMAL
UROBILINOGEN, URINE: 1 E.U./DL
WBC # BLD: 9.1 K/UL (ref 4–11)
WBC UA: 2 /HPF (ref 0–5)

## 2022-10-22 PROCEDURE — 12002 RPR S/N/AX/GEN/TRNK2.6-7.5CM: CPT

## 2022-10-22 PROCEDURE — 72125 CT NECK SPINE W/O DYE: CPT

## 2022-10-22 PROCEDURE — 81001 URINALYSIS AUTO W/SCOPE: CPT

## 2022-10-22 PROCEDURE — 99285 EMERGENCY DEPT VISIT HI MDM: CPT

## 2022-10-22 PROCEDURE — 70450 CT HEAD/BRAIN W/O DYE: CPT

## 2022-10-22 PROCEDURE — 83880 ASSAY OF NATRIURETIC PEPTIDE: CPT

## 2022-10-22 PROCEDURE — 90715 TDAP VACCINE 7 YRS/> IM: CPT | Performed by: EMERGENCY MEDICINE

## 2022-10-22 PROCEDURE — 80053 COMPREHEN METABOLIC PANEL: CPT

## 2022-10-22 PROCEDURE — 82550 ASSAY OF CK (CPK): CPT

## 2022-10-22 PROCEDURE — 6360000002 HC RX W HCPCS: Performed by: EMERGENCY MEDICINE

## 2022-10-22 PROCEDURE — 71045 X-RAY EXAM CHEST 1 VIEW: CPT

## 2022-10-22 PROCEDURE — 84484 ASSAY OF TROPONIN QUANT: CPT

## 2022-10-22 PROCEDURE — 93005 ELECTROCARDIOGRAM TRACING: CPT | Performed by: EMERGENCY MEDICINE

## 2022-10-22 PROCEDURE — 90471 IMMUNIZATION ADMIN: CPT | Performed by: EMERGENCY MEDICINE

## 2022-10-22 PROCEDURE — 85025 COMPLETE CBC W/AUTO DIFF WBC: CPT

## 2022-10-22 RX ORDER — NITROFURANTOIN 25; 75 MG/1; MG/1
100 CAPSULE ORAL 2 TIMES DAILY
Qty: 10 CAPSULE | Refills: 0 | Status: SHIPPED | OUTPATIENT
Start: 2022-10-22 | End: 2022-10-27

## 2022-10-22 RX ORDER — 0.9 % SODIUM CHLORIDE 0.9 %
1000 INTRAVENOUS SOLUTION INTRAVENOUS ONCE
Status: DISCONTINUED | OUTPATIENT
Start: 2022-10-22 | End: 2022-10-22 | Stop reason: HOSPADM

## 2022-10-22 RX ADMIN — TETANUS TOXOID, REDUCED DIPHTHERIA TOXOID AND ACELLULAR PERTUSSIS VACCINE, ADSORBED 0.5 ML: 5; 2.5; 8; 8; 2.5 SUSPENSION INTRAMUSCULAR at 10:37

## 2022-10-22 ASSESSMENT — PAIN DESCRIPTION - LOCATION: LOCATION: HEAD;ELBOW

## 2022-10-22 ASSESSMENT — PAIN - FUNCTIONAL ASSESSMENT: PAIN_FUNCTIONAL_ASSESSMENT: 0-10

## 2022-10-22 ASSESSMENT — PAIN DESCRIPTION - PAIN TYPE: TYPE: ACUTE PAIN

## 2022-10-22 ASSESSMENT — PAIN DESCRIPTION - ORIENTATION: ORIENTATION: LEFT

## 2022-10-22 ASSESSMENT — PAIN SCALES - GENERAL: PAINLEVEL_OUTOF10: 4

## 2022-10-22 ASSESSMENT — PAIN DESCRIPTION - DESCRIPTORS: DESCRIPTORS: ACHING

## 2022-10-22 NOTE — DISCHARGE INSTRUCTIONS
Take the macrobid as prescribed. You will need to get the staples removed in about 10 days. Keep wound clean and dry. Run clean water through the wound at least twice a day. Follow up with your PCP in 2 days. If persistent or worsening symptoms, or if you have any concerns, return to ED immediately.

## 2022-10-22 NOTE — ED TRIAGE NOTES
Pt arrived via EMS from [place] [address]. Pt had a fall around 0600 and staff found him around 0800. Pt hit head, lac to back of head, Pt also complaining of left elbow pain. Denies LOC. Denies blood thinners. Pt has dementia.

## 2022-10-22 NOTE — ED PROVIDER NOTES
[address] (Pt arrived via EMS from [address]. Pt had a fall around 0600 and staff found him around 0800. Pt hit head, lac to back of head, Pt also complaining of left elbow pain. Denies LOC. Denies blood thinners. Pt has dementia. )       HISTORY OF PRESENT ILLNESS  Buster Meneses is a 80 y.o. male  who was sent over from a nursing home for evaluation after fall. Per EMS, patient fell around 6 AM.  Staff found him lying on the ground around 8 AM and EMS called. He reports having difficulty with balance at baseline. Says he fell because he lost his balance. Rudell Dago backwards and hit the back of his head. Says he has bleeding from the scalp. No other complaints. No blood thinners. Says he did not lose consciousness. Says the fall was not due to chest pain, lightheadedness, or shortness of breath. Says denies any recent illnesses. No fevers, chills, vomiting, or diarrhea. No abdominal pain. No other complaints, modifying factors or associated symptoms. I have reviewed the following from the nursing documentation. Past Medical History:   Diagnosis Date    Anxiety     Auditory hallucinations     Cancer (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 [address]     Water retention      History reviewed. No pertinent surgical history. History reviewed. No pertinent family history.   Social History     Socioeconomic History    Marital status: Single     Spouse name: Not on file    Number of children: Not on file    Years of education: Not on file    Highest education level: Not on file   Occupational History    Not on file   Tobacco Use    Smoking status: Former    Smokeless tobacco: Never   Substance and Sexual Activity    Alcohol use: No    Drug use: No    Sexual activity: Not on file   Other Topics Concern    Not on file Social History Narrative    Not on file     Social Determinants of Health     Financial Resource Strain: Not on file   Food Insecurity: Not on file   Transportation Needs: Not on file   Physical Activity: Not on file   Stress: Not on file   Social Connections: Not on file   Intimate Partner Violence: Not on file   Housing Stability: Not on file     Current Facility-Administered Medications   Medication Dose Route Frequency Provider Last Rate Last Admin    0.9 % sodium chloride bolus  1,000 mL IntraVENous Once Chemo Harley MD         Current Outpatient Medications   Medication Sig Dispense Refill    nitrofurantoin, macrocrystal-monohydrate, (MACROBID) 100 MG capsule Take 1 capsule by mouth 2 times daily for 5 days 10 capsule 0    Balsam [place]-Castor Oil (VENELEX) OINT ointment Apply topically 2 times daily 30 g 0    pravastatin (PRAVACHOL) 40 MG tablet Take 40 mg by mouth daily. No Known Allergies    PMH, Surgical Hx, FH, Social Hx reviewed by myself. REVIEW OF SYSTEMS  10 systems reviewed, pertinent positives per HPI otherwise noted to be negative. PHYSICAL EXAM  /74   Pulse 79   Temp 98.4 °F (36.9 °C) (Oral)   Resp 15   Ht 6' (1.829 m)   Wt 166 lb 10.7 oz (75.6 kg)   SpO2 99%   BMI 22.60 kg/m²    GENERAL APPEARANCE: Awake and alert. No acute distress. HENT: Normocephalic. Atraumatic. EOMI. No facial droop. 4 cm scalp laceration. HEART/CHEST: RRR. LUNGS: Respirations unlabored. Speaking comfortably in full sentences. ABDOMEN: Soft, non-distended abdomen. Non tender to palpation. No guarding. No rebound. EXTREMITIES: No gross deformities. Full ROM at bilateral shoulder/elbow/wrist.  2+ radial pulses bilaterally. Skin intact. Slight contusion over the left elbow. Sensation intact all extremities. Moving all fingers bilaterally. Full ROM at bilateral hip/knee/ankle. 2+ DP pulses bilaterally. SKIN: Warm and dry. No acute rashes. NEUROLOGICAL: Alert and oriented.  No gross facial drooping. Answering questions appropriately. Moving all extremities. PSYCHIATRIC: Pleasant. Normal mood and affect.     LABS  Results for orders placed or performed during the hospital encounter of 10/22/22   Comprehensive Metabolic Panel   Result Value Ref Range    Sodium 136 136 - 145 mmol/L    Potassium 4.2 3.5 - 5.1 mmol/L    Chloride 102 99 - 110 mmol/L    CO2 26 21 - 32 mmol/L    Anion Gap 8 3 - 16    Glucose 86 70 - 99 mg/dL    BUN 27 (H) 7 - 20 mg/dL    Creatinine 0.6 (L) 0.8 - 1.3 mg/dL    Est, Glom Filt Rate >60 >60    Calcium 8.4 8.3 - 10.6 mg/dL    Total Protein 5.8 (L) 6.4 - 8.2 g/dL    Albumin 3.2 (L) 3.4 - 5.0 g/dL    Albumin/Globulin Ratio 1.2 1.1 - 2.2    Total Bilirubin 0.4 0.0 - 1.0 mg/dL    Alkaline Phosphatase 54 40 - 129 U/L    ALT 12 10 - 40 U/L    AST 19 15 - 37 U/L   CBC with Auto Differential   Result Value Ref Range    WBC 9.1 4.0 - 11.0 K/uL    RBC 3.19 (L) 4.20 - 5.90 M/uL    Hemoglobin 10.6 (L) 13.5 - 17.5 g/dL    Hematocrit 31.4 (L) 40.5 - 52.5 %    MCV 98.5 80.0 - 100.0 fL    MCH 33.2 26.0 - 34.0 pg    MCHC 33.8 31.0 - 36.0 g/dL    RDW 14.2 12.4 - 15.4 %    Platelets 417 059 - 809 K/uL    MPV 6.8 5.0 - 10.5 fL    Neutrophils % 83.2 %    Lymphocytes % 6.8 %    Monocytes % 9.4 %    Eosinophils % 0.3 %    Basophils % 0.3 %    Neutrophils Absolute 7.6 1.7 - 7.7 K/uL    Lymphocytes Absolute 0.6 (L) 1.0 - 5.1 K/uL    Monocytes Absolute 0.9 0.0 - 1.3 K/uL    Eosinophils Absolute 0.0 0.0 - 0.6 K/uL    Basophils Absolute 0.0 0.0 - 0.2 K/uL   Brain Natriuretic Peptide   Result Value Ref Range    Pro- (H) 0 - 124 pg/mL   Troponin   Result Value Ref Range    Troponin <0.01 <0.01 ng/mL   CK   Result Value Ref Range    Total CK 55 39 - 308 U/L   Urinalysis with Reflex to Culture    Specimen: Urine, clean catch   Result Value Ref Range    Color, UA Yellow Straw/Yellow    Clarity, UA Clear Clear    Glucose, Ur Negative Negative mg/dL    Bilirubin Urine Negative Negative    Ketones, Urine 40 (A) Negative mg/dL    Specific Gravity, UA 1.023 1.005 - 1.030    Blood, Urine Negative Negative    pH, UA 6.0 5.0 - 8.0    Protein, UA TRACE (A) Negative mg/dL    Urobilinogen, Urine 1.0 <2.0 E.U./dL    Nitrite, Urine Negative Negative    Leukocyte Esterase, Urine TRACE (A) Negative    Microscopic Examination YES     Urine Type NotGiven     Urine Reflex to Culture Not Indicated    Microscopic Urinalysis   Result Value Ref Range    Bacteria, UA None Seen None Seen /HPF    Hyaline Casts, UA 2 0 - 8 /LPF    WBC, UA 2 0 - 5 /HPF    RBC, UA 1 0 - 4 /HPF    Epithelial Cells, UA 0 0 - 5 /HPF       I have reviewed all labs for this visit. ECG  The Ekg interpreted by me shows  Normal sinus rhythm with a rate of 72  Axis is normal  QTc is normal  Intervals and Durations are unremarkable. ST Segments: Nonspecific changes  No significant change from prior EKG dated January 3, 2021    RADIOLOGY  CT HEAD WO CONTRAST    Result Date: 10/22/2022  EXAMINATION: CT OF THE CERVICAL SPINE WITHOUT CONTRAST; CT OF THE HEAD WITHOUT CONTRAST 10/22/2022 6:51 am TECHNIQUE: CT of the cervical spine was performed without the administration of intravenous contrast. Multiplanar reformatted images are provided for review. Automated exposure control, iterative reconstruction, and/or weight based adjustment of the mA/kV was utilized to reduce the radiation dose to as low as reasonably achievable.; CT of the head was performed without the administration of intravenous contrast. Automated exposure control, iterative reconstruction, and/or weight based adjustment of the mA/kV was utilized to reduce the radiation dose to as low as reasonably achievable. COMPARISON: 01/03/2021 HISTORY: ORDERING SYSTEM PROVIDED HISTORY: fall. dementia TECHNOLOGIST PROVIDED HISTORY: Reason for exam:->fall.  dementia Decision Support Exception - unselect if not a suspected or confirmed emergency medical condition->Emergency Medical Condition (MA) Reason for Exam: fall, neck pain; ORDERING SYSTEM PROVIDED HISTORY: fall. laceration occipital TECHNOLOGIST PROVIDED HISTORY: Has a \"code stroke\" or \"stroke alert\" been called? ->No Reason for exam:->fall. laceration occipital Decision Support Exception - unselect if not a suspected or confirmed emergency medical condition->Emergency Medical Condition (MA) Reason for Exam: fall, lac to back of head FINDINGS: HEAD CT: BRAIN/VENTRICLES:  No acute loss of the gray-white matter differentiation is identified to suggest acute or subacute infarct. No masses or hemorrhages within the brain parenchyma are found. No evidence of midline shift. There is mild periventricular low-attenuation, compatible with chronic small vessel ischemic disease. The intracranial vasculature, including the dural venous sinuses, is within normal limits. ORBITS: No acute orbital abnormalities are identified. SINUSES: The visualized paranasal sinuses and mastoid air cells are clear. SOFT TISSUES/SKULL: The calvarium is intact. Extracranial soft tissues are unremarkable. CERVICAL SPINE CT: BONES/ALIGNMENT: There is no evidence of an acute cervical spine fracture. There is normal alignment of the cervical spine. DEGENERATIVE CHANGES: Multilevel degenerative disc and facet disease noted. No high-grade central canal stenosis is found. SOFT TISSUES: There is no prevertebral soft tissue swelling. Head CT: No acute intracranial abnormality detected. Cervical spine CT: No acute fracture or traumatic malalignment. CT CERVICAL SPINE WO CONTRAST    Result Date: 10/22/2022  EXAMINATION: CT OF THE CERVICAL SPINE WITHOUT CONTRAST; CT OF THE HEAD WITHOUT CONTRAST 10/22/2022 6:51 am TECHNIQUE: CT of the cervical spine was performed without the administration of intravenous contrast. Multiplanar reformatted images are provided for review.  Automated exposure control, iterative reconstruction, and/or weight based adjustment of the mA/kV was utilized to reduce the radiation dose to as low as reasonably achievable.; CT of the head was performed without the administration of intravenous contrast. Automated exposure control, iterative reconstruction, and/or weight based adjustment of the mA/kV was utilized to reduce the radiation dose to as low as reasonably achievable. COMPARISON: 01/03/2021 HISTORY: ORDERING SYSTEM PROVIDED HISTORY: fall. dementia TECHNOLOGIST PROVIDED HISTORY: Reason for exam:->fall. dementia Decision Support Exception - unselect if not a suspected or confirmed emergency medical condition->Emergency Medical Condition (MA) Reason for Exam: fall, neck pain; ORDERING SYSTEM PROVIDED HISTORY: fall. laceration occipital TECHNOLOGIST PROVIDED HISTORY: Has a \"code stroke\" or \"stroke alert\" been called? ->No Reason for exam:->fall. laceration occipital Decision Support Exception - unselect if not a suspected or confirmed emergency medical condition->Emergency Medical Condition (MA) Reason for Exam: fall, lac to back of head FINDINGS: HEAD CT: BRAIN/VENTRICLES:  No acute loss of the gray-white matter differentiation is identified to suggest acute or subacute infarct. No masses or hemorrhages within the brain parenchyma are found. No evidence of midline shift. There is mild periventricular low-attenuation, compatible with chronic small vessel ischemic disease. The intracranial vasculature, including the dural venous sinuses, is within normal limits. ORBITS: No acute orbital abnormalities are identified. SINUSES: The visualized paranasal sinuses and mastoid air cells are clear. SOFT TISSUES/SKULL: The calvarium is intact. Extracranial soft tissues are unremarkable. CERVICAL SPINE CT: BONES/ALIGNMENT: There is no evidence of an acute cervical spine fracture. There is normal alignment of the cervical spine. DEGENERATIVE CHANGES: Multilevel degenerative disc and facet disease noted. No high-grade central canal stenosis is found.  SOFT TISSUES: There is no prevertebral soft tissue swelling. Head CT: No acute intracranial abnormality detected. Cervical spine CT: No acute fracture or traumatic malalignment. XR CHEST PORTABLE    Result Date: 10/22/2022  EXAMINATION: ONE XRAY VIEW OF THE CHEST 10/22/2022 6:50 am COMPARISON: 01/03/2021 HISTORY: Ex.  ORDERING SYSTEM PROVIDED HISTORY: other TECHNOLOGIST PROVIDED HISTORY: Reason for exam:->other Reason for Exam: recent fall FINDINGS: Examination is mildly limited as the patient's chin overlies the lung apices. The cardial-pericardial silhouette is unremarkable in appearance. The lungs are clear. No pneumothorax is found. No free air is seen. No acute bony abnormality. Unremarkable portable chest radiograph. ED COURSE/MDM  Patient seen and evaluated. At presentation, patient was awake, alert, afebrile, hemodynamically stable, and satting well on room air. He was noted to have a 4 cm laceration on the occipital scalp. No active bleeding noted. Given a Tdap. No midline c/t/l spine stepoffs or tenderness. He did not have pronator drift or leg drift. He is following commands. Has fluid speech. CT head and cervical spine obtained to assess for intracranial bleed and cervical spine injury. CT negative. He remained stable in the ER. Denies having any complaints. The laceration was repaired with staples. He was provided with wound care instructions. Urinalysis shows trace leuk esterase. Given prescription of Macrobid. He was discharged back to the nursing home in stable condition. PROCEDURE:  LACERATION REPAIR  Bri Mascorro or their surrogate had an opportunity to ask questions, and the risks, benefits, and alternatives were discussed. The wound was prepped and draped to maintain a sterile field. It was copiously irrigated. It was explored to its subcutaneous depth in a bloodless field with no sign of vascular injury. No foreign bodies were identified. It was closed with staples.  There were no complications during the procedure. Is this patient to be included in the SEP-1 Core Measure due to severe sepsis or septic shock? No   Exclusion criteria - the patient is NOT to be included for SEP-1 Core Measure due to:  2+ SIRS criteria are not met     Pt was seen during the COVID 19 pandemic. Appropriate PPE worn by ME during patient encounters. Patient was cared for during a time with constrained hospital bed capacity with nationwide stress on resources and staffing. During the patient's ED course, the patient was given:  Medications   0.9 % sodium chloride bolus (has no administration in time range)   tetanus-diphth-acell pertussis (BOOSTRIX) injection 0.5 mL (0.5 mLs IntraMUSCular Given 10/22/22 1037)        CLINICAL IMPRESSION  1. Fall, initial encounter    2. Laceration of scalp, initial encounter    3. Acute cystitis without hematuria        Blood pressure 105/74, pulse 79, temperature 98.4 °F (36.9 °C), temperature source Oral, resp. rate 15, height 6' (1.829 m), weight 166 lb 10.7 oz (75.6 kg), SpO2 99 %. DISPOSITION  Brandon Oakley was discharged to his nursing facility in stable condition. Patient was given scripts for the following medications. I counseled patient how to take these medications. Discharge Medication List as of 10/22/2022 12:45 PM        START taking these medications    Details   nitrofurantoin, macrocrystal-monohydrate, (MACROBID) 100 MG capsule Take 1 capsule by mouth 2 times daily for 5 days, Disp-10 capsule, R-[id number]             Follow-up with:  Reyna Jauregui MD  [address] [place] [address],[address]  [phone]    In 2 days      DISCLAIMER: This chart was created using Dragon dictation software. Efforts were made by me to ensure accuracy, however some errors may be present due to limitations of this technology and occasionally words are not transcribed correctly.        Romelia Hoffman MD  10/24/22 0078

## 2022-10-23 LAB
EKG ATRIAL RATE: 72 BPM
EKG DIAGNOSIS: NORMAL
EKG P AXIS: 37 DEGREES
EKG P-R INTERVAL: 118 MS
EKG Q-T INTERVAL: 398 MS
EKG QRS DURATION: 80 MS
EKG QTC CALCULATION (BAZETT): 435 MS
EKG R AXIS: 23 DEGREES
EKG T AXIS: 37 DEGREES
EKG VENTRICULAR RATE: 72 BPM